# Patient Record
Sex: MALE | Race: BLACK OR AFRICAN AMERICAN | URBAN - METROPOLITAN AREA
[De-identification: names, ages, dates, MRNs, and addresses within clinical notes are randomized per-mention and may not be internally consistent; named-entity substitution may affect disease eponyms.]

---

## 2023-03-24 ENCOUNTER — OFFICE (OUTPATIENT)
Dept: URBAN - METROPOLITAN AREA CLINIC 63 | Facility: CLINIC | Age: 73
Setting detail: OPHTHALMOLOGY
End: 2023-03-24
Payer: MEDICARE

## 2023-03-24 DIAGNOSIS — H52.4: ICD-10-CM

## 2023-03-24 DIAGNOSIS — H40.052: ICD-10-CM

## 2023-03-24 DIAGNOSIS — H04.123: ICD-10-CM

## 2023-03-24 DIAGNOSIS — H40.89: ICD-10-CM

## 2023-03-24 DIAGNOSIS — H40.1113: ICD-10-CM

## 2023-03-24 PROCEDURE — 92015 DETERMINE REFRACTIVE STATE: CPT | Performed by: STUDENT IN AN ORGANIZED HEALTH CARE EDUCATION/TRAINING PROGRAM

## 2023-03-24 PROCEDURE — 92014 COMPRE OPH EXAM EST PT 1/>: CPT | Performed by: STUDENT IN AN ORGANIZED HEALTH CARE EDUCATION/TRAINING PROGRAM

## 2023-03-24 ASSESSMENT — CORNEAL EDEMA CLINICAL DESCRIPTION: OD_CORNEALEDEMA: 2+

## 2023-03-24 ASSESSMENT — SUPERFICIAL PUNCTATE KERATITIS (SPK)
OD_SPK: 1+ 2+
OS_SPK: 1+ 2+

## 2023-03-24 ASSESSMENT — CONFRONTATIONAL VISUAL FIELD TEST (CVF)
OD_FINDINGS: FULL
OS_FINDINGS: FULL

## 2023-03-24 ASSESSMENT — DECREASING TEAR LAKE - SEVERITY SCORE
OS_DEC_TEARLAKE: T
OD_DEC_TEARLAKE: T

## 2023-03-25 ASSESSMENT — REFRACTION_MANIFEST
OD_SPHERE: BALANCE
OD_CYLINDER: BALANCE
OD_AXIS: BALANCE
OS_AXIS: 080
OS_CYLINDER: -1.25
OS_ADD: +2.50
OS_SPHERE: +1.00
OS_VA1: 20/25

## 2023-03-25 ASSESSMENT — REFRACTION_AUTOREFRACTION
OD_SPHERE: -20.00
OS_CYLINDER: -1.25
OS_AXIS: 082
OD_CYLINDER: -0.75
OS_SPHERE: +1.00
OD_AXIS: 060

## 2023-03-25 ASSESSMENT — KERATOMETRY
OD_K2POWER_DIOPTERS: 43.00
METHOD_AUTO_MANUAL: MANUAL
OS_K1POWER_DIOPTERS: 42.75
OS_AXISANGLE_DEGREES: 003
OD_K1POWER_DIOPTERS: 37.25
OD_AXISANGLE_DEGREES: 119
OS_K2POWER_DIOPTERS: 43.75

## 2023-03-25 ASSESSMENT — AXIALLENGTH_DERIVED
OS_AL: 23.5378
OD_AL: 38.61
OS_AL: 23.5378

## 2023-03-25 ASSESSMENT — SPHEQUIV_DERIVED
OS_SPHEQUIV: 0.375
OS_SPHEQUIV: 0.375
OD_SPHEQUIV: -20.375

## 2023-03-25 ASSESSMENT — VISUAL ACUITY
OS_BCVA: LP
OD_BCVA: 20/60-1

## 2023-04-17 ENCOUNTER — OFFICE (OUTPATIENT)
Dept: URBAN - METROPOLITAN AREA CLINIC 63 | Facility: CLINIC | Age: 73
Setting detail: OPHTHALMOLOGY
End: 2023-04-17
Payer: MEDICARE

## 2023-04-17 DIAGNOSIS — H43.822: ICD-10-CM

## 2023-04-17 DIAGNOSIS — H35.033: ICD-10-CM

## 2023-04-17 DIAGNOSIS — H34.8310: ICD-10-CM

## 2023-04-17 DIAGNOSIS — H43.11: ICD-10-CM

## 2023-04-17 DIAGNOSIS — E11.3293: ICD-10-CM

## 2023-04-17 PROCEDURE — 92012 INTRM OPH EXAM EST PATIENT: CPT | Performed by: OPHTHALMOLOGY

## 2023-04-17 PROCEDURE — 92134 CPTRZ OPH DX IMG PST SGM RTA: CPT | Performed by: OPHTHALMOLOGY

## 2023-04-17 ASSESSMENT — CORNEAL EDEMA CLINICAL DESCRIPTION: OD_CORNEALEDEMA: 2+

## 2023-04-17 ASSESSMENT — REFRACTION_MANIFEST
OS_SPHERE: +1.00
OS_ADD: +2.50
OD_CYLINDER: BALANCE
OD_AXIS: BALANCE
OS_AXIS: 080
OS_CYLINDER: -1.25
OS_VA1: 20/25
OD_SPHERE: BALANCE

## 2023-04-17 ASSESSMENT — AXIALLENGTH_DERIVED
OS_AL: 23.5378
OS_AL: 23.5378
OD_AL: 38.61

## 2023-04-17 ASSESSMENT — CONFRONTATIONAL VISUAL FIELD TEST (CVF)
OS_FINDINGS: FULL
OD_FINDINGS: FULL

## 2023-04-17 ASSESSMENT — KERATOMETRY
OD_K1POWER_DIOPTERS: 37.25
METHOD_AUTO_MANUAL: MANUAL
OS_K1POWER_DIOPTERS: 42.75
OS_AXISANGLE_DEGREES: 003
OD_K2POWER_DIOPTERS: 43.00
OS_K2POWER_DIOPTERS: 43.75
OD_AXISANGLE_DEGREES: 119

## 2023-04-17 ASSESSMENT — SPHEQUIV_DERIVED
OD_SPHEQUIV: -20.375
OS_SPHEQUIV: 0.375
OS_SPHEQUIV: 0.375

## 2023-04-17 ASSESSMENT — SUPERFICIAL PUNCTATE KERATITIS (SPK)
OD_SPK: 1+ 2+
OS_SPK: 1+ 2+

## 2023-04-17 ASSESSMENT — VISUAL ACUITY
OD_BCVA: 20/50
OS_BCVA: LP

## 2023-04-17 ASSESSMENT — REFRACTION_AUTOREFRACTION
OD_SPHERE: -20.00
OD_CYLINDER: -0.75
OD_AXIS: 060
OS_AXIS: 082
OS_CYLINDER: -1.25
OS_SPHERE: +1.00

## 2023-04-17 ASSESSMENT — DECREASING TEAR LAKE - SEVERITY SCORE
OS_DEC_TEARLAKE: T
OD_DEC_TEARLAKE: T

## 2023-05-23 ENCOUNTER — RX ONLY (RX ONLY)
Age: 73
End: 2023-05-23

## 2023-05-23 ENCOUNTER — OFFICE (OUTPATIENT)
Dept: URBAN - METROPOLITAN AREA CLINIC 63 | Facility: CLINIC | Age: 73
Setting detail: OPHTHALMOLOGY
End: 2023-05-23
Payer: MEDICARE

## 2023-05-23 DIAGNOSIS — H40.1113: ICD-10-CM

## 2023-05-23 DIAGNOSIS — H40.89: ICD-10-CM

## 2023-05-23 PROCEDURE — 92250 FUNDUS PHOTOGRAPHY W/I&R: CPT | Performed by: OPHTHALMOLOGY

## 2023-05-23 PROCEDURE — 99213 OFFICE O/P EST LOW 20 MIN: CPT | Performed by: OPHTHALMOLOGY

## 2023-05-23 ASSESSMENT — SUPERFICIAL PUNCTATE KERATITIS (SPK)
OS_SPK: 1+ 2+
OD_SPK: 1+ 2+

## 2023-05-23 ASSESSMENT — KERATOMETRY
METHOD_AUTO_MANUAL: MANUAL
OS_K2POWER_DIOPTERS: 43.75
OD_AXISANGLE_DEGREES: UTP
OS_K1POWER_DIOPTERS: 43.00
OS_AXISANGLE_DEGREES: 168
OD_K2POWER_DIOPTERS: UTP
OD_K1POWER_DIOPTERS: UTP

## 2023-05-23 ASSESSMENT — REFRACTION_AUTOREFRACTION
OS_AXIS: 072
OD_AXIS: UTP
OD_SPHERE: UTP
OS_SPHERE: +1.00
OD_CYLINDER: UTP
OS_CYLINDER: -1.25

## 2023-05-23 ASSESSMENT — REFRACTION_MANIFEST
OS_SPHERE: +1.00
OS_VA1: 20/25
OS_CYLINDER: -1.25
OD_AXIS: BALANCE
OD_CYLINDER: BALANCE
OS_AXIS: 080
OS_ADD: +2.50
OD_SPHERE: BALANCE

## 2023-05-23 ASSESSMENT — CONFRONTATIONAL VISUAL FIELD TEST (CVF)
OS_FINDINGS: FULL
OD_FINDINGS: FULL

## 2023-05-23 ASSESSMENT — SPHEQUIV_DERIVED
OS_SPHEQUIV: 0.375
OS_SPHEQUIV: 0.375

## 2023-05-23 ASSESSMENT — VISUAL ACUITY
OD_BCVA: 20/50+1
OS_BCVA: LP

## 2023-05-23 ASSESSMENT — DECREASING TEAR LAKE - SEVERITY SCORE
OS_DEC_TEARLAKE: T
OD_DEC_TEARLAKE: T

## 2023-05-23 ASSESSMENT — AXIALLENGTH_DERIVED
OS_AL: 23.4921
OS_AL: 23.4921

## 2023-05-23 ASSESSMENT — CORNEAL EDEMA CLINICAL DESCRIPTION: OD_CORNEALEDEMA: 2+

## 2023-06-07 ENCOUNTER — OFFICE (OUTPATIENT)
Dept: URBAN - METROPOLITAN AREA CLINIC 63 | Facility: CLINIC | Age: 73
Setting detail: OPHTHALMOLOGY
End: 2023-06-07
Payer: MEDICARE

## 2023-06-07 DIAGNOSIS — H40.1113: ICD-10-CM

## 2023-06-07 DIAGNOSIS — H04.123: ICD-10-CM

## 2023-06-07 PROCEDURE — 92012 INTRM OPH EXAM EST PATIENT: CPT | Performed by: STUDENT IN AN ORGANIZED HEALTH CARE EDUCATION/TRAINING PROGRAM

## 2023-06-07 ASSESSMENT — SUPERFICIAL PUNCTATE KERATITIS (SPK)
OD_SPK: 1+ 2+
OS_SPK: 1+ 2+

## 2023-06-07 ASSESSMENT — KERATOMETRY
OS_K2POWER_DIOPTERS: 43.75
OD_AXISANGLE_DEGREES: 128
OS_K1POWER_DIOPTERS: 42.75
OS_AXISANGLE_DEGREES: 163
OD_K2POWER_DIOPTERS: 48.00
METHOD_AUTO_MANUAL: MANUAL
OD_K1POWER_DIOPTERS: 35.00

## 2023-06-07 ASSESSMENT — DECREASING TEAR LAKE - SEVERITY SCORE
OD_DEC_TEARLAKE: T
OS_DEC_TEARLAKE: T

## 2023-06-07 ASSESSMENT — REFRACTION_MANIFEST
OD_SPHERE: BALANCE
OS_CYLINDER: -1.25
OS_SPHERE: +1.00
OD_CYLINDER: BALANCE
OS_VA1: 20/25
OS_AXIS: 080
OS_ADD: +2.50
OD_AXIS: BALANCE

## 2023-06-07 ASSESSMENT — CONFRONTATIONAL VISUAL FIELD TEST (CVF)
OS_FINDINGS: FULL
OD_FINDINGS: FULL

## 2023-06-07 ASSESSMENT — VISUAL ACUITY
OS_BCVA: LP
OD_BCVA: 20/50

## 2023-06-07 ASSESSMENT — REFRACTION_AUTOREFRACTION
OD_AXIS: 059
OS_CYLINDER: -1.75
OS_SPHERE: +1.25
OD_SPHERE: -20.00
OD_CYLINDER: -0.75
OS_AXIS: 067

## 2023-06-07 ASSESSMENT — AXIALLENGTH_DERIVED
OD_AL: 37.31
OS_AL: 23.5378
OS_AL: 23.5378

## 2023-06-07 ASSESSMENT — SPHEQUIV_DERIVED
OD_SPHEQUIV: -20.375
OS_SPHEQUIV: 0.375
OS_SPHEQUIV: 0.375

## 2023-06-07 ASSESSMENT — CORNEAL EDEMA CLINICAL DESCRIPTION: OD_CORNEALEDEMA: 2+

## 2023-06-07 ASSESSMENT — TONOMETRY: OD_IOP_MMHG: 10

## 2023-06-22 ENCOUNTER — OFFICE (OUTPATIENT)
Dept: URBAN - METROPOLITAN AREA CLINIC 63 | Facility: CLINIC | Age: 73
Setting detail: OPHTHALMOLOGY
End: 2023-06-22
Payer: MEDICARE

## 2023-06-22 DIAGNOSIS — H34.8310: ICD-10-CM

## 2023-06-22 DIAGNOSIS — H35.033: ICD-10-CM

## 2023-06-22 DIAGNOSIS — E11.3293: ICD-10-CM

## 2023-06-22 DIAGNOSIS — H43.11: ICD-10-CM

## 2023-06-22 DIAGNOSIS — H43.822: ICD-10-CM

## 2023-06-22 PROCEDURE — 92134 CPTRZ OPH DX IMG PST SGM RTA: CPT | Performed by: OPHTHALMOLOGY

## 2023-06-22 PROCEDURE — 92012 INTRM OPH EXAM EST PATIENT: CPT | Performed by: OPHTHALMOLOGY

## 2023-06-22 ASSESSMENT — SUPERFICIAL PUNCTATE KERATITIS (SPK)
OS_SPK: 1+ 2+
OD_SPK: 1+ 2+

## 2023-06-22 ASSESSMENT — DECREASING TEAR LAKE - SEVERITY SCORE
OS_DEC_TEARLAKE: T
OD_DEC_TEARLAKE: T

## 2023-06-22 ASSESSMENT — CORNEAL EDEMA CLINICAL DESCRIPTION: OD_CORNEALEDEMA: 2+

## 2023-06-22 ASSESSMENT — CONFRONTATIONAL VISUAL FIELD TEST (CVF)
OD_FINDINGS: FULL
OS_FINDINGS: FULL

## 2023-06-23 ASSESSMENT — KERATOMETRY
OD_K1POWER_DIOPTERS: 35.00
OS_K2POWER_DIOPTERS: 43.75
OD_K2POWER_DIOPTERS: 48.00
METHOD_AUTO_MANUAL: MANUAL
OD_AXISANGLE_DEGREES: 128
OS_AXISANGLE_DEGREES: 163
OS_K1POWER_DIOPTERS: 42.75

## 2023-06-23 ASSESSMENT — AXIALLENGTH_DERIVED
OS_AL: 23.5378
OD_AL: 37.31

## 2023-06-23 ASSESSMENT — REFRACTION_AUTOREFRACTION
OD_CYLINDER: -0.75
OS_SPHERE: +1.25
OS_CYLINDER: -1.75
OD_AXIS: 059
OD_SPHERE: -20.00
OS_AXIS: 067

## 2023-06-23 ASSESSMENT — SPHEQUIV_DERIVED
OS_SPHEQUIV: 0.375
OD_SPHEQUIV: -20.375

## 2023-06-23 ASSESSMENT — VISUAL ACUITY
OS_BCVA: LP
OD_BCVA: 20/50

## 2023-09-19 ENCOUNTER — OFFICE (OUTPATIENT)
Dept: URBAN - METROPOLITAN AREA CLINIC 63 | Facility: CLINIC | Age: 73
Setting detail: OPHTHALMOLOGY
End: 2023-09-19
Payer: MEDICARE

## 2023-09-19 DIAGNOSIS — H40.89: ICD-10-CM

## 2023-09-19 PROCEDURE — 92012 INTRM OPH EXAM EST PATIENT: CPT | Performed by: OPHTHALMOLOGY

## 2023-09-19 PROCEDURE — 92133 CPTRZD OPH DX IMG PST SGM ON: CPT | Performed by: OPHTHALMOLOGY

## 2023-09-19 ASSESSMENT — KERATOMETRY
OS_AXISANGLE_DEGREES: 163
OD_AXISANGLE_DEGREES: 128
OD_K2POWER_DIOPTERS: 48.00
METHOD_AUTO_MANUAL: MANUAL
OS_K1POWER_DIOPTERS: 42.75
OD_K1POWER_DIOPTERS: 35.00
OS_K2POWER_DIOPTERS: 43.75

## 2023-09-19 ASSESSMENT — REFRACTION_AUTOREFRACTION
OD_AXIS: 059
OD_CYLINDER: -0.75
OD_SPHERE: -20.00
OS_CYLINDER: -1.75
OS_SPHERE: +1.25
OS_AXIS: 067

## 2023-09-19 ASSESSMENT — VISUAL ACUITY
OS_BCVA: LP
OD_BCVA: 20/50

## 2023-09-19 ASSESSMENT — CORNEAL EDEMA CLINICAL DESCRIPTION: OD_CORNEALEDEMA: 2+

## 2023-09-19 ASSESSMENT — DECREASING TEAR LAKE - SEVERITY SCORE
OD_DEC_TEARLAKE: T
OS_DEC_TEARLAKE: T

## 2023-09-19 ASSESSMENT — SPHEQUIV_DERIVED
OD_SPHEQUIV: -20.375
OS_SPHEQUIV: 0.375

## 2023-09-19 ASSESSMENT — AXIALLENGTH_DERIVED
OD_AL: 37.31
OS_AL: 23.5378

## 2023-09-19 ASSESSMENT — TONOMETRY
OD_IOP_MMHG: 10
OS_IOP_MMHG: 10

## 2023-09-19 ASSESSMENT — SUPERFICIAL PUNCTATE KERATITIS (SPK)
OD_SPK: 1+ 2+
OS_SPK: 1+ 2+

## 2023-09-19 ASSESSMENT — CONFRONTATIONAL VISUAL FIELD TEST (CVF)
OS_FINDINGS: FULL
OD_FINDINGS: FULL

## 2024-03-18 ENCOUNTER — OFFICE (OUTPATIENT)
Dept: URBAN - METROPOLITAN AREA CLINIC 63 | Facility: CLINIC | Age: 74
Setting detail: OPHTHALMOLOGY
End: 2024-03-18
Payer: MEDICARE

## 2024-03-18 DIAGNOSIS — E11.3293: ICD-10-CM

## 2024-03-18 DIAGNOSIS — H35.033: ICD-10-CM

## 2024-03-18 DIAGNOSIS — H34.8310: ICD-10-CM

## 2024-03-18 DIAGNOSIS — H43.11: ICD-10-CM

## 2024-03-18 PROCEDURE — 92014 COMPRE OPH EXAM EST PT 1/>: CPT | Performed by: OPHTHALMOLOGY

## 2024-03-18 PROCEDURE — 92134 CPTRZ OPH DX IMG PST SGM RTA: CPT | Performed by: OPHTHALMOLOGY

## 2024-08-27 ENCOUNTER — OFFICE (OUTPATIENT)
Dept: URBAN - METROPOLITAN AREA CLINIC 63 | Facility: CLINIC | Age: 74
Setting detail: OPHTHALMOLOGY
End: 2024-08-27
Payer: MEDICARE

## 2024-08-27 DIAGNOSIS — H35.033: ICD-10-CM

## 2024-08-27 DIAGNOSIS — H40.89: ICD-10-CM

## 2024-08-27 PROCEDURE — 92012 INTRM OPH EXAM EST PATIENT: CPT | Performed by: OPHTHALMOLOGY

## 2024-08-27 PROCEDURE — 92133 CPTRZD OPH DX IMG PST SGM ON: CPT | Performed by: OPHTHALMOLOGY

## 2024-08-27 PROCEDURE — 92020 GONIOSCOPY: CPT | Performed by: OPHTHALMOLOGY

## 2024-08-27 ASSESSMENT — CONFRONTATIONAL VISUAL FIELD TEST (CVF)
OD_FINDINGS: FULL
OS_FINDINGS: FULL

## 2025-02-11 ENCOUNTER — OFFICE (OUTPATIENT)
Dept: URBAN - METROPOLITAN AREA CLINIC 63 | Facility: CLINIC | Age: 75
Setting detail: OPHTHALMOLOGY
End: 2025-02-11
Payer: COMMERCIAL

## 2025-02-11 DIAGNOSIS — H40.89: ICD-10-CM

## 2025-02-11 DIAGNOSIS — H35.033: ICD-10-CM

## 2025-02-11 PROCEDURE — 92012 INTRM OPH EXAM EST PATIENT: CPT | Performed by: OPHTHALMOLOGY

## 2025-02-11 PROCEDURE — 92250 FUNDUS PHOTOGRAPHY W/I&R: CPT | Performed by: OPHTHALMOLOGY

## 2025-02-11 ASSESSMENT — CONFRONTATIONAL VISUAL FIELD TEST (CVF)
OD_FINDINGS: FULL
OS_FINDINGS: FULL

## 2025-02-11 ASSESSMENT — KERATOMETRY
OS_K1POWER_DIOPTERS: 42.75
OS_K2POWER_DIOPTERS: 43.75
OD_K2POWER_DIOPTERS: 48.00
METHOD_AUTO_MANUAL: MANUAL
OD_K1POWER_DIOPTERS: 35.00
OD_AXISANGLE_DEGREES: 128
OS_AXISANGLE_DEGREES: 163

## 2025-02-11 ASSESSMENT — REFRACTION_AUTOREFRACTION
OS_AXIS: 067
OD_AXIS: 059
OS_SPHERE: +1.25
OS_CYLINDER: -1.75
OD_SPHERE: -20.00
OD_CYLINDER: -0.75

## 2025-02-11 ASSESSMENT — SUPERFICIAL PUNCTATE KERATITIS (SPK)
OD_SPK: 1+ 2+
OS_SPK: 1+ 2+

## 2025-02-11 ASSESSMENT — VISUAL ACUITY
OD_BCVA: 20/30-2
OS_BCVA: LP

## 2025-02-11 ASSESSMENT — DECREASING TEAR LAKE - SEVERITY SCORE
OS_DEC_TEARLAKE: T
OD_DEC_TEARLAKE: T

## 2025-02-11 ASSESSMENT — CORNEAL EDEMA CLINICAL DESCRIPTION: OD_CORNEALEDEMA: 2+

## 2025-06-24 ENCOUNTER — OFFICE (OUTPATIENT)
Dept: URBAN - METROPOLITAN AREA CLINIC 63 | Facility: CLINIC | Age: 75
Setting detail: OPHTHALMOLOGY
End: 2025-06-24
Payer: COMMERCIAL

## 2025-06-24 DIAGNOSIS — H04.123: ICD-10-CM

## 2025-06-24 DIAGNOSIS — H40.1113: ICD-10-CM

## 2025-06-24 DIAGNOSIS — H35.033: ICD-10-CM

## 2025-06-24 PROCEDURE — 92012 INTRM OPH EXAM EST PATIENT: CPT | Performed by: OPHTHALMOLOGY

## 2025-06-24 ASSESSMENT — REFRACTION_AUTOREFRACTION
OS_CYLINDER: -1.00
OS_SPHERE: +0.25
OD_SPHERE: -20.00
OD_CYLINDER: -0.75
OD_AXIS: 059
OS_AXIS: 071

## 2025-06-24 ASSESSMENT — SUPERFICIAL PUNCTATE KERATITIS (SPK)
OS_SPK: 1+ 2+
OD_SPK: 1+ 2+

## 2025-06-24 ASSESSMENT — KERATOMETRY
OS_AXISANGLE_DEGREES: 033
OS_K1POWER_DIOPTERS: 43.25
OS_K2POWER_DIOPTERS: 43.75
METHOD_AUTO_MANUAL: MANUAL

## 2025-06-24 ASSESSMENT — DECREASING TEAR LAKE - SEVERITY SCORE
OD_DEC_TEARLAKE: T
OS_DEC_TEARLAKE: T

## 2025-06-24 ASSESSMENT — CONFRONTATIONAL VISUAL FIELD TEST (CVF)
OS_FINDINGS: FULL
OD_FINDINGS: FULL

## 2025-06-24 ASSESSMENT — CORNEAL EDEMA CLINICAL DESCRIPTION: OD_CORNEALEDEMA: 2+

## 2025-06-24 ASSESSMENT — VISUAL ACUITY
OD_BCVA: 20/30-2
OS_BCVA: LP

## 2025-07-08 ENCOUNTER — INPATIENT (INPATIENT)
Facility: HOSPITAL | Age: 75
LOS: 3 days | Discharge: HOME | DRG: 948 | End: 2025-07-12
Attending: INTERNAL MEDICINE | Admitting: INTERNAL MEDICINE
Payer: COMMERCIAL

## 2025-07-08 VITALS
TEMPERATURE: 98 F | DIASTOLIC BLOOD PRESSURE: 107 MMHG | SYSTOLIC BLOOD PRESSURE: 154 MMHG | HEART RATE: 93 BPM | WEIGHT: 117.51 LBS | HEIGHT: 67 IN | RESPIRATION RATE: 19 BRPM | OXYGEN SATURATION: 96 %

## 2025-07-08 DIAGNOSIS — R29.90 UNSPECIFIED SYMPTOMS AND SIGNS INVOLVING THE NERVOUS SYSTEM: ICD-10-CM

## 2025-07-08 DIAGNOSIS — E11.9 TYPE 2 DIABETES MELLITUS WITHOUT COMPLICATIONS: ICD-10-CM

## 2025-07-08 DIAGNOSIS — S06.5XAA TRAUMATIC SUBDURAL HEMORRHAGE WITH LOSS OF CONSCIOUSNESS STATUS UNKNOWN, INITIAL ENCOUNTER: ICD-10-CM

## 2025-07-08 DIAGNOSIS — I10 ESSENTIAL (PRIMARY) HYPERTENSION: ICD-10-CM

## 2025-07-08 DIAGNOSIS — N40.0 BENIGN PROSTATIC HYPERPLASIA WITHOUT LOWER URINARY TRACT SYMPTOMS: ICD-10-CM

## 2025-07-08 DIAGNOSIS — G93.41 METABOLIC ENCEPHALOPATHY: ICD-10-CM

## 2025-07-08 DIAGNOSIS — R41.82 ALTERED MENTAL STATUS, UNSPECIFIED: ICD-10-CM

## 2025-07-08 DIAGNOSIS — E16.2 HYPOGLYCEMIA, UNSPECIFIED: ICD-10-CM

## 2025-07-08 DIAGNOSIS — K21.9 GASTRO-ESOPHAGEAL REFLUX DISEASE WITHOUT ESOPHAGITIS: ICD-10-CM

## 2025-07-08 DIAGNOSIS — Z29.9 ENCOUNTER FOR PROPHYLACTIC MEASURES, UNSPECIFIED: ICD-10-CM

## 2025-07-08 DIAGNOSIS — E78.5 HYPERLIPIDEMIA, UNSPECIFIED: ICD-10-CM

## 2025-07-08 LAB
ALBUMIN SERPL ELPH-MCNC: 3.3 G/DL — SIGNIFICANT CHANGE UP (ref 3.3–5)
ALP SERPL-CCNC: 69 U/L — SIGNIFICANT CHANGE UP (ref 30–120)
ALT FLD-CCNC: 17 U/L — SIGNIFICANT CHANGE UP (ref 10–60)
ANION GAP SERPL CALC-SCNC: 8 MMOL/L — SIGNIFICANT CHANGE UP (ref 5–17)
APTT BLD: 30 SEC — SIGNIFICANT CHANGE UP (ref 26.1–36.8)
AST SERPL-CCNC: 39 U/L — SIGNIFICANT CHANGE UP (ref 10–40)
BASOPHILS # BLD AUTO: 0.02 K/UL — SIGNIFICANT CHANGE UP (ref 0–0.2)
BASOPHILS # BLD MANUAL: 0 K/UL — SIGNIFICANT CHANGE UP (ref 0–0.2)
BASOPHILS NFR BLD AUTO: 0.6 % — SIGNIFICANT CHANGE UP (ref 0–2)
BASOPHILS NFR BLD MANUAL: 0 % — SIGNIFICANT CHANGE UP (ref 0–2)
BILIRUB SERPL-MCNC: 0.2 MG/DL — SIGNIFICANT CHANGE UP (ref 0.2–1.2)
BUN SERPL-MCNC: 14 MG/DL — SIGNIFICANT CHANGE UP (ref 7–23)
CALCIUM SERPL-MCNC: 8.6 MG/DL — SIGNIFICANT CHANGE UP (ref 8.4–10.5)
CHLORIDE SERPL-SCNC: 105 MMOL/L — SIGNIFICANT CHANGE UP (ref 96–108)
CO2 SERPL-SCNC: 28 MMOL/L — SIGNIFICANT CHANGE UP (ref 22–31)
CREAT SERPL-MCNC: 1.12 MG/DL — SIGNIFICANT CHANGE UP (ref 0.5–1.3)
EGFR: 68 ML/MIN/1.73M2 — SIGNIFICANT CHANGE UP
EGFR: 68 ML/MIN/1.73M2 — SIGNIFICANT CHANGE UP
EOSINOPHIL # BLD AUTO: 0.1 K/UL — SIGNIFICANT CHANGE UP (ref 0–0.5)
EOSINOPHIL # BLD MANUAL: 0.18 K/UL — SIGNIFICANT CHANGE UP (ref 0–0.5)
EOSINOPHIL NFR BLD AUTO: 2.9 % — SIGNIFICANT CHANGE UP (ref 0–6)
EOSINOPHIL NFR BLD MANUAL: 5.2 % — SIGNIFICANT CHANGE UP (ref 0–6)
GLUCOSE BLDC GLUCOMTR-MCNC: 137 MG/DL — HIGH (ref 70–99)
GLUCOSE BLDC GLUCOMTR-MCNC: 91 MG/DL — SIGNIFICANT CHANGE UP (ref 70–99)
GLUCOSE SERPL-MCNC: 76 MG/DL — SIGNIFICANT CHANGE UP (ref 70–99)
HCT VFR BLD CALC: 38.4 % — LOW (ref 39–50)
HGB BLD-MCNC: 12.3 G/DL — LOW (ref 13–17)
IMM GRANULOCYTES # BLD AUTO: 0.01 K/UL — SIGNIFICANT CHANGE UP (ref 0–0.07)
IMM GRANULOCYTES NFR BLD AUTO: 0.3 % — SIGNIFICANT CHANGE UP (ref 0–0.9)
INR BLD: 1.02 RATIO — SIGNIFICANT CHANGE UP (ref 0.85–1.16)
LYMPHOCYTES # BLD AUTO: 1.29 K/UL — SIGNIFICANT CHANGE UP (ref 1–3.3)
LYMPHOCYTES # BLD MANUAL: 1.24 K/UL — SIGNIFICANT CHANGE UP (ref 1–3.3)
LYMPHOCYTES NFR BLD AUTO: 37.9 % — SIGNIFICANT CHANGE UP (ref 13–44)
LYMPHOCYTES NFR BLD MANUAL: 36.5 % — SIGNIFICANT CHANGE UP (ref 13–44)
MCHC RBC-ENTMCNC: 30.4 PG — SIGNIFICANT CHANGE UP (ref 27–34)
MCHC RBC-ENTMCNC: 32 G/DL — SIGNIFICANT CHANGE UP (ref 32–36)
MCV RBC AUTO: 95 FL — SIGNIFICANT CHANGE UP (ref 80–100)
MONOCYTES # BLD AUTO: 0.33 K/UL — SIGNIFICANT CHANGE UP (ref 0–0.9)
MONOCYTES # BLD MANUAL: 0.21 K/UL — SIGNIFICANT CHANGE UP (ref 0–0.9)
MONOCYTES NFR BLD AUTO: 9.7 % — SIGNIFICANT CHANGE UP (ref 2–14)
MONOCYTES NFR BLD MANUAL: 6.1 % — SIGNIFICANT CHANGE UP (ref 2–14)
NEUTROPHILS # BLD AUTO: 1.65 K/UL — LOW (ref 1.8–7.4)
NEUTROPHILS # BLD MANUAL: 1.77 K/UL — LOW (ref 1.8–7.4)
NEUTROPHILS NFR BLD AUTO: 48.6 % — SIGNIFICANT CHANGE UP (ref 43–77)
NEUTROPHILS NFR BLD MANUAL: 52.2 % — SIGNIFICANT CHANGE UP (ref 43–77)
NRBC # BLD AUTO: 0 K/UL — SIGNIFICANT CHANGE UP (ref 0–0)
NRBC # FLD: 0 K/UL — SIGNIFICANT CHANGE UP (ref 0–0)
NRBC BLD AUTO-RTO: 0 /100 WBCS — SIGNIFICANT CHANGE UP (ref 0–0)
PLAT MORPH BLD: NORMAL — SIGNIFICANT CHANGE UP
PLATELET # BLD AUTO: 238 K/UL — SIGNIFICANT CHANGE UP (ref 150–400)
PLATELET COUNT - ESTIMATE: NORMAL — SIGNIFICANT CHANGE UP
PMV BLD: 8.5 FL — SIGNIFICANT CHANGE UP (ref 7–13)
POTASSIUM SERPL-MCNC: 3.6 MMOL/L — SIGNIFICANT CHANGE UP (ref 3.5–5.3)
POTASSIUM SERPL-SCNC: 3.6 MMOL/L — SIGNIFICANT CHANGE UP (ref 3.5–5.3)
PROT SERPL-MCNC: 6.5 G/DL — SIGNIFICANT CHANGE UP (ref 6–8.3)
PROTHROM AB SERPL-ACNC: 11.8 SEC — SIGNIFICANT CHANGE UP (ref 9.9–13.4)
RBC # BLD: 4.04 M/UL — LOW (ref 4.2–5.8)
RBC # FLD: 13.1 % — SIGNIFICANT CHANGE UP (ref 10.3–14.5)
RBC BLD AUTO: NORMAL — SIGNIFICANT CHANGE UP
SODIUM SERPL-SCNC: 141 MMOL/L — SIGNIFICANT CHANGE UP (ref 135–145)
TROPONIN I, HIGH SENSITIVITY RESULT: 25.8 NG/L — SIGNIFICANT CHANGE UP
WBC # BLD: 3.4 K/UL — LOW (ref 3.8–10.5)
WBC # FLD AUTO: 3.4 K/UL — LOW (ref 3.8–10.5)

## 2025-07-08 PROCEDURE — 93010 ELECTROCARDIOGRAM REPORT: CPT

## 2025-07-08 PROCEDURE — 71045 X-RAY EXAM CHEST 1 VIEW: CPT | Mod: 26

## 2025-07-08 PROCEDURE — 82962 GLUCOSE BLOOD TEST: CPT

## 2025-07-08 PROCEDURE — 70498 CT ANGIOGRAPHY NECK: CPT | Mod: 26

## 2025-07-08 PROCEDURE — 36415 COLL VENOUS BLD VENIPUNCTURE: CPT

## 2025-07-08 PROCEDURE — 70450 CT HEAD/BRAIN W/O DYE: CPT | Mod: 26,XU

## 2025-07-08 PROCEDURE — 85025 COMPLETE CBC W/AUTO DIFF WBC: CPT

## 2025-07-08 PROCEDURE — 85730 THROMBOPLASTIN TIME PARTIAL: CPT

## 2025-07-08 PROCEDURE — 70496 CT ANGIOGRAPHY HEAD: CPT | Mod: 26

## 2025-07-08 PROCEDURE — 71045 X-RAY EXAM CHEST 1 VIEW: CPT

## 2025-07-08 PROCEDURE — 70496 CT ANGIOGRAPHY HEAD: CPT

## 2025-07-08 PROCEDURE — 80053 COMPREHEN METABOLIC PANEL: CPT

## 2025-07-08 PROCEDURE — 70498 CT ANGIOGRAPHY NECK: CPT

## 2025-07-08 PROCEDURE — 70450 CT HEAD/BRAIN W/O DYE: CPT

## 2025-07-08 PROCEDURE — 84484 ASSAY OF TROPONIN QUANT: CPT

## 2025-07-08 PROCEDURE — 99285 EMERGENCY DEPT VISIT HI MDM: CPT

## 2025-07-08 PROCEDURE — 85610 PROTHROMBIN TIME: CPT

## 2025-07-08 RX ORDER — GLUCAGON 3 MG/1
1 POWDER NASAL ONCE
Refills: 0 | Status: DISCONTINUED | OUTPATIENT
Start: 2025-07-08 | End: 2025-07-12

## 2025-07-08 RX ORDER — DICLOFENAC SODIUM 10 MG/G
2.25 GEL TOPICAL
Refills: 0 | DISCHARGE

## 2025-07-08 RX ORDER — ONDANSETRON HCL/PF 4 MG/2 ML
4 VIAL (ML) INJECTION EVERY 8 HOURS
Refills: 0 | Status: DISCONTINUED | OUTPATIENT
Start: 2025-07-08 | End: 2025-07-12

## 2025-07-08 RX ORDER — RISPERIDONE 4 MG
0.5 TABLET ORAL DAILY
Refills: 0 | Status: DISCONTINUED | OUTPATIENT
Start: 2025-07-08 | End: 2025-07-12

## 2025-07-08 RX ORDER — MELATONIN 5 MG
3 TABLET ORAL AT BEDTIME
Refills: 0 | Status: DISCONTINUED | OUTPATIENT
Start: 2025-07-08 | End: 2025-07-12

## 2025-07-08 RX ORDER — FINASTERIDE 1 MG/1
5 TABLET, FILM COATED ORAL DAILY
Refills: 0 | Status: DISCONTINUED | OUTPATIENT
Start: 2025-07-08 | End: 2025-07-12

## 2025-07-08 RX ORDER — SODIUM CHLORIDE 9 G/1000ML
1000 INJECTION, SOLUTION INTRAVENOUS
Refills: 0 | Status: DISCONTINUED | OUTPATIENT
Start: 2025-07-08 | End: 2025-07-12

## 2025-07-08 RX ORDER — ACETAMINOPHEN 500 MG/5ML
650 LIQUID (ML) ORAL EVERY 6 HOURS
Refills: 0 | Status: DISCONTINUED | OUTPATIENT
Start: 2025-07-08 | End: 2025-07-12

## 2025-07-08 RX ORDER — ACETAMINOPHEN 500 MG/5ML
1000 LIQUID (ML) ORAL ONCE
Refills: 0 | Status: DISCONTINUED | OUTPATIENT
Start: 2025-07-08 | End: 2025-07-12

## 2025-07-08 RX ORDER — DEXTROSE 50 % IN WATER 50 %
15 SYRINGE (ML) INTRAVENOUS ONCE
Refills: 0 | Status: DISCONTINUED | OUTPATIENT
Start: 2025-07-08 | End: 2025-07-12

## 2025-07-08 RX ORDER — POLYETHYLENE GLYCOL 3350 17 G/17G
17 POWDER, FOR SOLUTION ORAL DAILY
Refills: 0 | Status: DISCONTINUED | OUTPATIENT
Start: 2025-07-08 | End: 2025-07-12

## 2025-07-08 RX ORDER — BRIMONIDINE TARTRATE, TIMOLOL MALEATE 2; 5 MG/ML; MG/ML
1 SOLUTION/ DROPS TOPICAL
Refills: 0 | DISCHARGE

## 2025-07-08 RX ORDER — ALFUZOSIN HYDROCHLORIDE 10 MG/1
1 TABLET, EXTENDED RELEASE ORAL
Refills: 0 | DISCHARGE

## 2025-07-08 RX ORDER — HALOPERIDOL 10 MG/1
0.5 TABLET ORAL EVERY 6 HOURS
Refills: 0 | Status: DISCONTINUED | OUTPATIENT
Start: 2025-07-08 | End: 2025-07-12

## 2025-07-08 RX ORDER — DEXTROSE 50 % IN WATER 50 %
25 SYRINGE (ML) INTRAVENOUS ONCE
Refills: 0 | Status: DISCONTINUED | OUTPATIENT
Start: 2025-07-08 | End: 2025-07-12

## 2025-07-08 RX ORDER — DEXTROSE 50 % IN WATER 50 %
12.5 SYRINGE (ML) INTRAVENOUS ONCE
Refills: 0 | Status: DISCONTINUED | OUTPATIENT
Start: 2025-07-08 | End: 2025-07-12

## 2025-07-08 RX ORDER — BIMATOPROST 0.3 MG/ML
1 SOLUTION/ DROPS OPHTHALMIC
Refills: 0 | DISCHARGE

## 2025-07-08 RX ORDER — ATORVASTATIN CALCIUM 80 MG/1
80 TABLET, FILM COATED ORAL AT BEDTIME
Refills: 0 | Status: DISCONTINUED | OUTPATIENT
Start: 2025-07-08 | End: 2025-07-12

## 2025-07-08 RX ORDER — NETARSUDIL 0.2 MG/ML
1 SOLUTION/ DROPS OPHTHALMIC; TOPICAL
Refills: 0 | DISCHARGE

## 2025-07-08 RX ORDER — SODIUM CHLORIDE 9 G/1000ML
1000 INJECTION, SOLUTION INTRAVENOUS
Refills: 0 | Status: DISCONTINUED | OUTPATIENT
Start: 2025-07-08 | End: 2025-07-09

## 2025-07-08 RX ORDER — LATANOPROST PF 0.05 MG/ML
1 SOLUTION/ DROPS OPHTHALMIC AT BEDTIME
Refills: 0 | Status: DISCONTINUED | OUTPATIENT
Start: 2025-07-08 | End: 2025-07-12

## 2025-07-08 RX ORDER — LIDOCAINE HYDROCHLORIDE 20 MG/ML
1 JELLY TOPICAL DAILY
Refills: 0 | Status: DISCONTINUED | OUTPATIENT
Start: 2025-07-09 | End: 2025-07-12

## 2025-07-08 RX ORDER — MAGNESIUM, ALUMINUM HYDROXIDE 200-200 MG
30 TABLET,CHEWABLE ORAL EVERY 4 HOURS
Refills: 0 | Status: DISCONTINUED | OUTPATIENT
Start: 2025-07-08 | End: 2025-07-12

## 2025-07-08 RX ORDER — BISACODYL 5 MG
10 TABLET, DELAYED RELEASE (ENTERIC COATED) ORAL DAILY
Refills: 0 | Status: DISCONTINUED | OUTPATIENT
Start: 2025-07-08 | End: 2025-07-12

## 2025-07-08 RX ADMIN — LATANOPROST PF 1 DROP(S): 0.05 SOLUTION/ DROPS OPHTHALMIC at 22:10

## 2025-07-08 RX ADMIN — SODIUM CHLORIDE 50 MILLILITER(S): 9 INJECTION, SOLUTION INTRAVENOUS at 22:09

## 2025-07-08 NOTE — H&P ADULT - PROBLEM SELECTOR PLAN 3
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  - Not a candidate for mechanical thrombectomy w/ distal right vertebral artery occlusion- non amendable to thrombectomy.  - Follow up HgbA1c/ lipid profile - start HD atrovastatin daily   - Bedside dysphagia screen /PT / OT evaluation  - MRI brain w/o contrast

## 2025-07-08 NOTE — ED ADULT NURSE NOTE - CHIEF COMPLAINT QUOTE
PT BIB EMS from [facility] c/o AMS; pt found unresponsive at facility at 1700 with glucose of 49; pt got glucagon x2 from facility; hx dementia; pt responsive upon arrival to ED ; answers to name

## 2025-07-08 NOTE — CONSULT NOTE ADULT - SUBJECTIVE AND OBJECTIVE BOX
CHIEF COMPLAINT/ REASON FOR VISIT  .. Patient was seen to address the  issue listed under PROBLEM LIST which is located toward bottom of this note     [facility]    Allergies    Allergy Status Unknown    Intolerances        PAST MEDICAL & SURGICAL HISTORY:  DM (diabetes mellitus), type 2      HTN (hypertension)      HLD (hyperlipidemia)      Dementia      GERD (gastroesophageal reflux disease)      BPH (benign prostatic hyperplasia)      Anxiety      Glaucoma      DM (diabetes mellitus), type 2          FAMILY HISTORY:      Home Medications:  acetaminophen 500 mg oral tablet: 1 tab(s) orally once a day (08 Jul 2025 20:44)  alfuzosin 10 mg oral tablet, extended release: 1 tab(s) orally once a day (at bedtime) (08 Jul 2025 20:44)  Aspercreme Arthritis Pain 1% topical gel: Apply topically to affected area once a day apply to lower back (08 Jul 2025 20:44)  Basaglar KwikPen 100 units/mL subcutaneous solution: 12 unit(s) subcutaneous once a day (08 Jul 2025 20:44)  Combigan 0.2%-0.5% ophthalmic solution: 1 drop(s) in each affected eye 2 times a day each eye (08 Jul 2025 20:44)  finasteride 5 mg oral tablet: 1 tab(s) orally once a day (08 Jul 2025 20:44)  lisinopril 10 mg oral tablet: 1 tab(s) orally once a day (08 Jul 2025 20:44)  Lumigan 0.01% ophthalmic solution: 1 drop(s) in each affected eye once a day (at bedtime) each eye (08 Jul 2025 20:46)  metFORMIN 500 mg oral tablet: 1 tab(s) orally once a day (08 Jul 2025 20:44)  NovoLOG 100 units/mL subcutaneous solution: 4 unit(s) subcutaneous 3 times a day (with meals) (08 Jul 2025 20:44)  pantoprazole 40 mg oral delayed release tablet: 1 tab(s) orally once a day (in the morning) (08 Jul 2025 20:44)  Rhopressa 0.02% ophthalmic solution: 1 drop(s) in each eye 3 times a day each eye (08 Jul 2025 20:44)  risperiDONE 0.5 mg oral tablet: 1 tab(s) orally once a day (08 Jul 2025 20:44)      MEDICATIONS  (STANDING):  atorvastatin 80 milliGRAM(s) Oral at bedtime  dextrose 5% + sodium chloride 0.9%. 1000 milliLiter(s) (50 mL/Hr) IV Continuous <Continuous>  dextrose 5%. 1000 milliLiter(s) (100 mL/Hr) IV Continuous <Continuous>  dextrose 5%. 1000 milliLiter(s) (50 mL/Hr) IV Continuous <Continuous>  dextrose 50% Injectable 25 Gram(s) IV Push once  dextrose 50% Injectable 12.5 Gram(s) IV Push once  dextrose 50% Injectable 25 Gram(s) IV Push once  dextrose Oral Gel 15 Gram(s) Oral once  finasteride 5 milliGRAM(s) Oral daily  glucagon  Injectable 1 milliGRAM(s) IntraMuscular once  latanoprost 0.005% Ophthalmic Solution 1 Drop(s) Both EYES at bedtime  pantoprazole  Injectable 40 milliGRAM(s) IV Push daily  polyethylene glycol 3350 17 Gram(s) Oral daily  risperiDONE   Tablet 0.5 milliGRAM(s) Oral daily    MEDICATIONS  (PRN):  acetaminophen     Tablet .. 650 milliGRAM(s) Oral every 6 hours PRN Temp greater or equal to 38C (100.4F), Mild Pain (1 - 3)  acetaminophen   IVPB .. 1000 milliGRAM(s) IV Intermittent once PRN Temp greater or equal to 38.5C (101.3F), Moderate Pain (4 - 6)  aluminum hydroxide/magnesium hydroxide/simethicone Suspension 30 milliLiter(s) Oral every 4 hours PRN Dyspepsia  bisacodyl Suppository 10 milliGRAM(s) Rectal daily PRN Constipation  haloperidol    Injectable 0.5 milliGRAM(s) IntraMuscular every 6 hours PRN Agitation  melatonin 3 milliGRAM(s) Oral at bedtime PRN Insomnia  ondansetron Injectable 4 milliGRAM(s) IV Push every 8 hours PRN Nausea and/or Vomiting      Diet, NPO:   Except Medications (07-08-25 @ 20:41) [Active]          Vital Signs Last 24 Hrs  T(C): 36.4 (08 Jul 2025 21:00), Max: 36.6 (08 Jul 2025 18:17)  T(F): 97.5 (08 Jul 2025 21:00), Max: 97.9 (08 Jul 2025 18:17)  HR: 82 (08 Jul 2025 21:00) (73 - 94)  BP: 128/84 (08 Jul 2025 21:00) (128/84 - 215/93)  BP(mean): 112 (08 Jul 2025 20:30) (112 - 123)  RR: 14 (08 Jul 2025 21:00) (14 - 19)  SpO2: 100% (08 Jul 2025 21:00) (96% - 100%)    Parameters below as of 08 Jul 2025 21:00  Patient On (Oxygen Delivery Method): nasal cannula  O2 Flow (L/min): 2                LABS:                        12.3   3.40  )-----------( 238      ( 08 Jul 2025 18:16 )             38.4     07-08    141  |  105  |  14  ----------------------------<  76  3.6   |  28  |  1.12    Ca    8.6      08 Jul 2025 18:16    TPro  6.5  /  Alb  3.3  /  TBili  0.2  /  DBili  x   /  AST  39  /  ALT  17  /  AlkPhos  69  07-08    PT/INR - ( 08 Jul 2025 18:16 )   PT: 11.8 sec;   INR: 1.02 ratio         PTT - ( 08 Jul 2025 18:16 )  PTT:30.0 sec  Urinalysis Basic - ( 08 Jul 2025 18:16 )    Color: x / Appearance: x / SG: x / pH: x  Gluc: 76 mg/dL / Ketone: x  / Bili: x / Urobili: x   Blood: x / Protein: x / Nitrite: x   Leuk Esterase: x / RBC: x / WBC x   Sq Epi: x / Non Sq Epi: x / Bacteria: x            WBC:  WBC Count: 3.40 K/uL (07-08 @ 18:16)      MICROBIOLOGY:  RECENT CULTURES:              PT/INR - ( 08 Jul 2025 18:16 )   PT: 11.8 sec;   INR: 1.02 ratio         PTT - ( 08 Jul 2025 18:16 )  PTT:30.0 sec    Sodium:  Sodium: 141 mmol/L (07-08 @ 18:16)      1.12 mg/dL 07-08 @ 18:16      Hemoglobin:  Hemoglobin: 12.3 g/dL (07-08 @ 18:16)      Platelets: Platelet Count - Automated: 238 K/uL (07-08 @ 18:16)      LIVER FUNCTIONS - ( 08 Jul 2025 18:16 )  Alb: 3.3 g/dL / Pro: 6.5 g/dL / ALK PHOS: 69 U/L / ALT: 17 U/L / AST: 39 U/L / GGT: x             Urinalysis Basic - ( 08 Jul 2025 18:16 )    Color: x / Appearance: x / SG: x / pH: x  Gluc: 76 mg/dL / Ketone: x  / Bili: x / Urobili: x   Blood: x / Protein: x / Nitrite: x   Leuk Esterase: x / RBC: x / WBC x   Sq Epi: x / Non Sq Epi: x / Bacteria: x        RADIOLOGY & ADDITIONAL STUDIES:      MICROBIOLOGY:  RECENT CULTURES:

## 2025-07-08 NOTE — ED ADULT NURSE NOTE - NS ED NURSE RECORD ANOTHER HT AND WT
Detail Level: Simple Initiate Treatment: Triamcinolone acetonide 0.1% topical cream: Apply to affected areas of body twice daily x 1-2 weeks until clear. Plan: I advised patient to stop using [employer] lotion and to switch to something fragrance free, such as Cerave cream. Yes

## 2025-07-08 NOTE — ED PROVIDER NOTE - OBJECTIVE STATEMENT
Patient brought in by EMS from [facility] for altered mental status.  Per EMS report patient's last known well was just prior to 5 PM when patient was next seen he was unresponsive.  EMS relates staff at [facility] found the patient to be hypoglycemic with a blood sugar of 49 they gave 2 doses of glucagon and oral glucose paste and blood glucose improved to 71 upon EMS arrival.  EMS reports they were told patient has dementia his baseline is awake and confused which is how the patient presented with EMS.  Patient poor historian secondary to dementia unsure why in ER denies any complaints.  No further history available.  PMD Herminio Bird

## 2025-07-08 NOTE — ED PROVIDER NOTE - CLINICAL SUMMARY MEDICAL DECISION MAKING FREE TEXT BOX
Patient brought in by EMS from [facility] for altered mental status.  Per EMS report patient's last known well was just prior to 5 PM when patient was next seen he was unresponsive.  EMS relates staff at [facility] found the patient to be hypoglycemic with a blood sugar of 49 they gave 2 doses of glucagon and oral glucose paste and blood glucose improved to 71 upon EMS arrival.  EMS reports they were told patient has dementia his baseline is awake and confused which is how the patient presented with EMS.  Patient poor historian secondary to dementia unsure why in ER denies any complaints.  No further history available.  PMD Herminio Bird    Plan stroke workup including CT head CTA head and neck EKG labs hypoglycemia protocol    Differential including but not limited to ICH CVA TIA arrhythmia hypoglycemia other electrolyte abnormality

## 2025-07-08 NOTE — PATIENT PROFILE ADULT - FALL HARM RISK - HARM RISK INTERVENTIONS

## 2025-07-08 NOTE — ED ADULT NURSE NOTE - OBJECTIVE STATEMENT
75 yo male biba from [facility] nursing and rehab for AMS. Pt was being given a shower by staff, was placed back in bed, when CNA returned moments later they noted that the patient was not responsive, this was around 5:10PM. FS was checked, reading low. given x 2 of glucagon pta. Pt drowsy but arousable, answers to name. BP noted to be elevated upon arrival. Staff from NH stating pt received BP meds this AM. Pt denies any pain. On CM.

## 2025-07-08 NOTE — ED ADULT TRIAGE NOTE - STATUS:
Patient on xarelto, recently had a miscarriage and recent D and C 6 weeks. Last week had heavy vaginal bleeding again, treated and had a blood transfusion today, during which her BP and heart rate spiked and the transfusion was stopped. Vitals came down and patient was discharged. Patient was home for a few hours and both increased again. Applied

## 2025-07-08 NOTE — H&P ADULT - HISTORY OF PRESENT ILLNESS
Patient is 75 yo AAM who was brought in by EMS from [facility] for altered mental status.  Per EMS report patient's last known well was just prior to 5 PM when patient was next seen he was unresponsive.  EMS relates staff at [facility] found the patient to be hypoglycemic with a blood sugar of 49 they gave 2 doses of glucagon and oral glucose paste and blood glucose improved to 71 upon EMS arrival.  EMS reports they were told patient has dementia his baseline is awake and confused which is how the patient presented with EMS.  Patient poor historian secondary to dementia unsure why in ER denies any complaints.  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  - Not a candidate for mechanical thrombectomy w/ distal right vertebral artery occlusion- non amendable to thrombectomy.  - Follow up HgbA1c/ lipid profile - start HD atrovastatin daily   - Bedside dysphagia screen /PT / OT evaluation  - MRI brain w/o contrast  - TTE; r/o cardioembolic event /PFO  - Maintain permissive HTN for 24 hours

## 2025-07-08 NOTE — H&P ADULT - PROBLEM SELECTOR PLAN 5
Hypoglycemia protocol , FS q 4 hrs Accu-Cheks monitoring and insulin corrective regimen  sliding scale coverage with short acting insulin, add long-acting insulin as needed ,no concentrated sweets diet, serial labs ,HbA1C,education

## 2025-07-08 NOTE — ED PROVIDER NOTE - PROGRESS NOTE DETAILS
D/W Cristy (telestroke PA) agrees telenecteplace likely due to hypoglycemia, measurable deficits likely chronic called telestroke CTA results d/w telestroke PA, she relates doesn't think TNK indicated, but  she will d/w telestroke attending discussed case with Dr. Boyd will admit. Cristy from telestroke pa, recommend no TNK given patient hx of subdural, recommend admit for stroke eval and MRI

## 2025-07-08 NOTE — H&P ADULT - NSICDXPASTMEDICALHX_GEN_ALL_CORE_FT
PAST MEDICAL HISTORY:  Anxiety     BPH (benign prostatic hyperplasia)     Dementia     DM (diabetes mellitus), type 2     GERD (gastroesophageal reflux disease)     Glaucoma     HLD (hyperlipidemia)     HTN (hypertension)

## 2025-07-08 NOTE — ED ADULT NURSE REASSESSMENT NOTE - NS ED NURSE REASSESS COMMENT FT1
spoke with patients daughter Heaven Marr ([phone]). Pt remains resting stretcher, drowsy but arousable.  currently.

## 2025-07-08 NOTE — ED ADULT TRIAGE NOTE - CHIEF COMPLAINT QUOTE
PT BIB EMS from [facility] c/o AMS; pt found unresponsive at facility at 1700 with glucose of 49; pt got glucagon x2 from facility; hx dementia; pt responsive upon arrival to ED ; answers to name

## 2025-07-08 NOTE — H&P ADULT - NSHPLABSRESULTS_GEN_ALL_CORE
< from: Xray Chest 1 View- PORTABLE-Urgent (07.08.25 @ 18:48) >    Single frontal view of the chest demonstrates the lungs to be clear. The   cardiomediastinal silhouette is enlarged. No acute osseous abnormalities.      IMPRESSION: No acute cardiopulmonary disease process.    < end of copied text >    < from: CT Brain Stroke Protocol (07.08.25 @ 18:27) >    CT head:    No acute infarct or hemorrhage is present. No significant edema is   identified. Encephalomalacia in the left occipital lobe, suggestive of   chronic infarct.  Small right holohemispheric subdural fluid collection   is present measuring up to 0.9 cm in width, may reflect a hygroma versus   sequelae of old subdural hematoma. No mass effect on underlying   parenchyma.  The ventricles, sulci and basal cisterns appear unremarkable.    The paranasal sinuses and mastoid air cells are clear.    Glaucoma device of the right globe with decreased size of the right   globe. Status post bilateral lens replacements.    CTA head:    There is atherosclerotic irregularity of the intradural right vertebral   artery with occlusion of the distal most segment of the right intradural   vertebral artery. Left intradural vertebral artery and basilar artery   appear unremarkable. Bilateral posterior cerebral arteries and superior   cerebellar arteries are unremarkable.    The intracranial internal carotid arteries, middle cerebral arteries, and   anterior cerebral arteries are intact without hemodynamically significant   stenosis.  There is no evidence of aneurysm or vascular malformation.    Dural venous sinuses are patent.    CTA neck:    The carotid circulation is intact without hemodynamically significant   stenosis. There are calcifications of bilateral carotid bulbs.  The   vertebral arteries are patent.      IMPRESSION:      1.   Brain: No acute infarct or hemorrhage.  Encephalomalacia in the   left occipital lobe, suggestive of chronic infarct.  Small right   holohemispheric subdural fluid collection is present measuring up to 0.9   cm in width, may reflect a hygroma versus sequelae of old subdural   hematoma.  2.   Intracranial circulation:  There is atherosclerotic irregularity of   the intradural right vertebral artery with occlusion of the distal most   segment of the right intradural vertebral artery. Left intradural   vertebral artery and basilar artery appear unremarkable. No   hemodynamically significant stenosis of the anterior circulation.      3.    Right carotid/vertebral artery system:  No hemodynamically   significant stenosis.      4.   Left carotid/vertebral artery system:  No hemodynamically   significant stenosis.  < end of copied text >

## 2025-07-08 NOTE — ED ADULT NURSE NOTE - NSFALLRISKINTERV_ED_ALL_ED
Assistance OOB with selected safe patient handling equipment if applicable/Assistance with ambulation/Communicate fall risk and risk factors to all staff, patient, and family/Monitor gait and stability/Monitor for mental status changes and reorient to person, place, and time, as needed/Move patient closer to nursing station/within visual sight of ED staff/Provide visual cue: yellow wristband, yellow gown, etc/Reinforce activity limits and safety measures with patient and family/Toileting schedule using arm’s reach rule for commode and bathroom/Use of alarms - bed, stretcher, chair and/or video monitoring/Call bell, personal items and telephone in reach/Instruct patient to call for assistance before getting out of bed/chair/stretcher/Non-slip footwear applied when patient is off stretcher/[place] to call system/Physically safe environment - no spills, clutter or unnecessary equipment/Purposeful Proactive Rounding/Room/bathroom lighting operational, light cord in reach Assistance OOB with selected safe patient handling equipment if applicable/Assistance with ambulation/Communicate fall risk and risk factors to all staff, patient, and family/Monitor gait and stability/Monitor for mental status changes and reorient to person, place, and time, as needed/Move patient closer to nursing station/within visual sight of ED staff/Provide patient with walking aids/Provide visual cue: yellow wristband, yellow gown, etc/Reinforce activity limits and safety measures with patient and family/Toileting schedule using arm’s reach rule for commode and bathroom/Use of alarms - bed, stretcher, chair and/or video monitoring/Call bell, personal items and telephone in reach/Instruct patient to call for assistance before getting out of bed/chair/stretcher/Non-slip footwear applied when patient is off stretcher/[place] to call system/Physically safe environment - no spills, clutter or unnecessary equipment/Purposeful Proactive Rounding/Room/bathroom lighting operational, light cord in reach

## 2025-07-08 NOTE — ED ADULT NURSE NOTE - NSSEPSISSUSPECTED_ED_A_ED
Ventricular Rate : 75  Atrial Rate : 75  P-R Interval : 128  QRS Duration : 88  Q-T Interval : 418  QTC Calculation(Bezet) : 466  P Axis : 76  R Axis : 2  T Axis : 34  Diagnosis : Normal sinus rhythm  Normal ECG  No previous ECGs available  Confirmed by MAE GROVES AMIT ([id number]) on 4/11/2019 11:29:03 AM   No

## 2025-07-08 NOTE — ED PROVIDER NOTE - DIFFERENTIAL DIAGNOSIS
Differential Diagnosis Differential including but not limited to ICH CVA TIA arrhythmia hypoglycemia other electrolyte abnormality

## 2025-07-08 NOTE — H&P ADULT - ASSESSMENT
Patient is 73 yo AAM who was brought in by EMS from [facility] for altered mental status.  Per EMS report patient's last known well was just prior to 5 PM when patient was next seen he was unresponsive.  EMS relates staff at [facility] found the patient to be hypoglycemic with a blood sugar of 49 they gave 2 doses of glucagon and oral glucose paste and blood glucose improved to 71 upon EMS arrival.  EMS reports they were told patient has dementia his baseline is awake and confused which is how the patient presented with EMS.  Patient poor historian secondary to dementia unsure why in ER denies any complaints.  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  - Not a candidate for mechanical thrombectomy w/ distal right vertebral artery occlusion- non amendable to thrombectomy.  - Follow up HgbA1c/ lipid profile - start HD atrovastatin daily   - Bedside dysphagia screen /PT / OT evaluation  - MRI brain w/o contrast

## 2025-07-08 NOTE — CHART NOTE - NSCHARTNOTEFT_GEN_A_CORE
Audio Telestroke Consult Note    FREDY HOUSTON ,MRN-[id number] , presented to [facility], w/ last known normal time 5pm    HPI:    75 y/o Male hx dementia ( at baseline awake and confused) who was  BIBEMS from [facility] for unresponsiveness,  Per EMS report patient's last known well was just prior to 5 PM when patient was next seen he was unresponsive.  EMS relates staff at [facility] found the patient to be hypoglycemic with a blood sugar of 49 s/p 2 doses of glucagon and oral glucose paste and glucose improved to 71 upon arrival to ED.  EMS reports they were told patient has dementia his baseline is awake and confused which is how the patient presented with EMS.  Patient poor historian secondary to dementia unsure why in ER denies any complaints.     Per ED attending: NIHSS 5 for RLE drift, unable to answer LOC questions (hx dementia), dysarthric (adentitious)       NEUROLOGIC EXAMINATION deferred – interprofessional audio discussion only       HOME MEDICATIONS:  Home Medications:         VITALS/DATA/ORDERS: [x] Reviewed     IMAGING:  < from: CT Brain Stroke Protocol (07.08.25 @ 18:27) >          1.   Brain: No acute infarct or hemorrhage.  Encephalomalacia in the   left occipital lobe, suggestive of chronic infarct.  Small right   holohemispheric subdural fluid collection is present measuring up to 0.9   cm in width, may reflect a hygroma versus sequelae of old subdural   hematoma.      Critical value:  I discussed the finding of this report with Dr. Erwin at 6:35 PM on 7/8/2025.  Critical value policy of the hospital   was followed.  Read back and confirmation of receipt of this   communication was performed.  This verbal communication supplements the   text report of this document.    2.   Intracranial circulation:  There is atherosclerotic irregularity of   the intradural right vertebral artery with occlusion of the distal most   segment of the right intradural vertebral artery. Left intradural   vertebral artery and basilar artery appear unremarkable. No   hemodynamically significant stenosis of the anterior circulation.          3.    Right carotid/vertebral artery system:  No hemodynamically   significant stenosis.        4.   Left carotid/vertebral artery system:  No hemodynamically   significant stenosis.    < end of copied text >      Labs:                                             12.3                  Neurophils% (auto):   48.6   (07-08 @ 18:16):    3.40 )-----------(238          Lymphocytes% (auto):  37.9                                          38.4                   Eosinphils% (auto):   2.9      Manual%: Neutrophils x    ; Lymphocytes x    ; Eosinophils x    ; Bands%: x    ; Blasts x                                    141    |  105    |  14                  Calcium: 8.6   / iCa: x      (07-08 @ 18:16)    ----------------------------<  76        Magnesium: x                                3.6     |  28     |  1.12             Phosphorous: x        TPro  6.5    /  Alb  3.3    /  TBili  0.2    /  DBili  x      /  AST  39     /  ALT  17     /  AlkPhos  69     08 Jul 2025 18:16    ( 07-08 @ 18:16 )   PT: 11.8 sec;   INR: 1.02 ratio  aPTT: 30.0 sec    CAPILLARY BLOOD GLUCOSE      POCT Blood Glucose.: 137 mg/dL (08 Jul 2025 19:48)    Platelet Count - Automated: 238 K/uL (07-08-25 @ 18:16)  Platelet Count - Estimate: Normal (07-08-25 @ 18:16)        Inclusion Criteria:  Clearly defined time onset within 4.5 hours of treatment YES [X]    Ischemic stroke with a measurable deficit on NIHSS or a non-measurable deficit that is deemed disabling?  NO [X]    Review thrombolytic CONTRAINDICATIONS  RELATIVE EXCLUSION CRITERIA   [X] stroke severity too mild (non-disabling)    RISKS/BENEFITS DISCUSSION done by ED provider.    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  - Not a candidate for mechanical thrombectomy w/ distal right vertebral artery occlusion- non amendable to thrombectomy.  - Follow up HgbA1c/ lipid profile - start HD atrovastatin daily   - Bedside dysphagia screen /PT / OT evaluation  - MRI brain w/o contrast  - TTE; r/o cardioembolic event /PFO  - Maintain permissive HTN for 24 hours   - Further management and stroke workup by inhouse Neurology team  - Plan discussed with Dr. Key, Dr. Yohannes Dupont    Consultation provided via live, two-way audio  stream.     Patient Location:  [facility] Attestation of Treatment:  The management and treatment decisions which include intravenous thrombolysis and mechanical thrombectomy were discussed with: Neurology Attending.     Neurology Attending: Dr. Kika Sierra Audio Telestroke Consult Note    FREDY HOUSTON ,[id number] , presented to [facility], w/ last known normal time 5pm    HPI:    75 y/o Male hx dementia ( at baseline awake and confused) who was  BIBEMS from [facility] for unresponsiveness,  Per EMS report patient's last known well was just prior to 5 PM when patient was next seen he was unresponsive.  EMS relates staff at [facility] found the patient to be hypoglycemic with a blood sugar of 49 s/p 2 doses of glucagon and oral glucose paste and glucose improved to 71 upon arrival to ED.  EMS reports they were told patient has dementia his baseline is awake and confused which is how the patient presented with EMS.  Patient poor historian secondary to dementia unsure why in ER denies any complaints.     Per ED attending: NIHSS 5 for RLE drift, unable to answer LOC questions (hx dementia), dysarthric (adentitious)       NEUROLOGIC EXAMINATION deferred – interprofessional audio discussion only       HOME MEDICATIONS:  Home Medications:         VITALS/DATA/ORDERS: [x] Reviewed     IMAGING:  < from: CT Brain Stroke Protocol (07.08.25 @ 18:27) >          1.   Brain: No acute infarct or hemorrhage.  Encephalomalacia in the   left occipital lobe, suggestive of chronic infarct.  Small right   holohemispheric subdural fluid collection is present measuring up to 0.9   cm in width, may reflect a hygroma versus sequelae of old subdural   hematoma.      Critical value:  I discussed the finding of this report with Dr. Erwin at 6:35 PM on 7/8/2025.  Critical value policy of the hospital   was followed.  Read back and confirmation of receipt of this   communication was performed.  This verbal communication supplements the   text report of this document.    2.   Intracranial circulation:  There is atherosclerotic irregularity of   the intradural right vertebral artery with occlusion of the distal most   segment of the right intradural vertebral artery. Left intradural   vertebral artery and basilar artery appear unremarkable. No   hemodynamically significant stenosis of the anterior circulation.          3.    Right carotid/vertebral artery system:  No hemodynamically   significant stenosis.        4.   Left carotid/vertebral artery system:  No hemodynamically   significant stenosis.    < end of copied text >      Labs:                                             12.3                  Neurophils% (auto):   48.6   (07-08 @ 18:16):    3.40 )-----------(238          Lymphocytes% (auto):  37.9                                          38.4                   Eosinphils% (auto):   2.9      Manual%: Neutrophils x    ; Lymphocytes x    ; Eosinophils x    ; Bands%: x    ; Blasts x                                    141    |  105    |  14                  Calcium: 8.6   / iCa: x      (07-08 @ 18:16)    ----------------------------<  76        Magnesium: x                                3.6     |  28     |  1.12             Phosphorous: x        TPro  6.5    /  Alb  3.3    /  TBili  0.2    /  DBili  x      /  AST  39     /  ALT  17     /  AlkPhos  69     08 Jul 2025 18:16    ( 07-08 @ 18:16 )   PT: 11.8 sec;   INR: 1.02 ratio  aPTT: 30.0 sec    CAPILLARY BLOOD GLUCOSE      POCT Blood Glucose.: 137 mg/dL (08 Jul 2025 19:48)    Platelet Count - Automated: 238 K/uL (07-08-25 @ 18:16)  Platelet Count - Estimate: Normal (07-08-25 @ 18:16)        Inclusion Criteria:  Clearly defined time onset within 4.5 hours of treatment YES [X]    Ischemic stroke with a measurable deficit on NIHSS or a non-measurable deficit that is deemed disabling?  NO [X]    Review thrombolytic CONTRAINDICATIONS  RELATIVE EXCLUSION CRITERIA   [X] stroke severity too mild (non-disabling)    RISKS/BENEFITS DISCUSSION done by ED provider.    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  - Not a candidate for mechanical thrombectomy w/ distal right vertebral artery occlusion- non amendable to thrombectomy.  - Follow up HgbA1c/ lipid profile - start HD atrovastatin daily   - Bedside dysphagia screen /PT / OT evaluation  - MRI brain w/o contrast  - TTE; r/o cardioembolic event /PFO  - Maintain permissive HTN for 24 hours   - Further management and stroke workup by inhouse Neurology team  - Plan discussed with Dr. Key, Dr. Yohannes Dupont and Dr. Brown-Lizzy    Consultation provided via live, two-way audio  stream.     Patient Location:  [facility] Attestation of Treatment:  The management and treatment decisions which include intravenous thrombolysis and mechanical thrombectomy were discussed with: Neurology Attending.     Neurology Attending: Dr. Kika Sierra

## 2025-07-09 DIAGNOSIS — Z51.5 ENCOUNTER FOR PALLIATIVE CARE: ICD-10-CM

## 2025-07-09 DIAGNOSIS — Z71.89 OTHER SPECIFIED COUNSELING: ICD-10-CM

## 2025-07-09 LAB
A1C WITH ESTIMATED AVERAGE GLUCOSE RESULT: 7.2 % — HIGH (ref 4–5.6)
ALBUMIN SERPL ELPH-MCNC: 3.1 G/DL — LOW (ref 3.3–5)
ALP SERPL-CCNC: 73 U/L — SIGNIFICANT CHANGE UP (ref 30–120)
ALT FLD-CCNC: 15 U/L — SIGNIFICANT CHANGE UP (ref 10–60)
ANION GAP SERPL CALC-SCNC: 9 MMOL/L — SIGNIFICANT CHANGE UP (ref 5–17)
AST SERPL-CCNC: 36 U/L — SIGNIFICANT CHANGE UP (ref 10–40)
BASOPHILS # BLD AUTO: 0.02 K/UL — SIGNIFICANT CHANGE UP (ref 0–0.2)
BASOPHILS NFR BLD AUTO: 0.3 % — SIGNIFICANT CHANGE UP (ref 0–2)
BILIRUB SERPL-MCNC: 0.3 MG/DL — SIGNIFICANT CHANGE UP (ref 0.2–1.2)
BUN SERPL-MCNC: 14 MG/DL — SIGNIFICANT CHANGE UP (ref 7–23)
CALCIUM SERPL-MCNC: 8.6 MG/DL — SIGNIFICANT CHANGE UP (ref 8.4–10.5)
CHLORIDE SERPL-SCNC: 106 MMOL/L — SIGNIFICANT CHANGE UP (ref 96–108)
CHOLEST SERPL-MCNC: 155 MG/DL — SIGNIFICANT CHANGE UP
CO2 SERPL-SCNC: 25 MMOL/L — SIGNIFICANT CHANGE UP (ref 22–31)
CREAT SERPL-MCNC: 0.95 MG/DL — SIGNIFICANT CHANGE UP (ref 0.5–1.3)
EGFR: 83 ML/MIN/1.73M2 — SIGNIFICANT CHANGE UP
EGFR: 83 ML/MIN/1.73M2 — SIGNIFICANT CHANGE UP
EOSINOPHIL # BLD AUTO: 0.05 K/UL — SIGNIFICANT CHANGE UP (ref 0–0.5)
EOSINOPHIL NFR BLD AUTO: 0.7 % — SIGNIFICANT CHANGE UP (ref 0–6)
ESTIMATED AVERAGE GLUCOSE: 160 MG/DL — HIGH (ref 68–114)
FOLATE SERPL-MCNC: 17.7 NG/ML — SIGNIFICANT CHANGE UP
GLUCOSE BLDC GLUCOMTR-MCNC: 105 MG/DL — HIGH (ref 70–99)
GLUCOSE BLDC GLUCOMTR-MCNC: 162 MG/DL — HIGH (ref 70–99)
GLUCOSE BLDC GLUCOMTR-MCNC: 201 MG/DL — HIGH (ref 70–99)
GLUCOSE BLDC GLUCOMTR-MCNC: 248 MG/DL — HIGH (ref 70–99)
GLUCOSE SERPL-MCNC: 102 MG/DL — HIGH (ref 70–99)
HCT VFR BLD CALC: 40.7 % — SIGNIFICANT CHANGE UP (ref 39–50)
HDLC SERPL-MCNC: 66 MG/DL — SIGNIFICANT CHANGE UP
HGB BLD-MCNC: 12.8 G/DL — LOW (ref 13–17)
IMM GRANULOCYTES # BLD AUTO: 0.01 K/UL — SIGNIFICANT CHANGE UP (ref 0–0.07)
IMM GRANULOCYTES NFR BLD AUTO: 0.1 % — SIGNIFICANT CHANGE UP (ref 0–0.9)
INR BLD: 0.98 RATIO — SIGNIFICANT CHANGE UP (ref 0.85–1.16)
LDLC SERPL-MCNC: 79 MG/DL — SIGNIFICANT CHANGE UP
LIPID PNL WITH DIRECT LDL SERPL: 79 MG/DL — SIGNIFICANT CHANGE UP
LYMPHOCYTES # BLD AUTO: 0.87 K/UL — LOW (ref 1–3.3)
LYMPHOCYTES NFR BLD AUTO: 12.4 % — LOW (ref 13–44)
MCHC RBC-ENTMCNC: 30.8 PG — SIGNIFICANT CHANGE UP (ref 27–34)
MCHC RBC-ENTMCNC: 31.4 G/DL — LOW (ref 32–36)
MCV RBC AUTO: 97.8 FL — SIGNIFICANT CHANGE UP (ref 80–100)
MONOCYTES # BLD AUTO: 0.54 K/UL — SIGNIFICANT CHANGE UP (ref 0–0.9)
MONOCYTES NFR BLD AUTO: 7.7 % — SIGNIFICANT CHANGE UP (ref 2–14)
NEUTROPHILS # BLD AUTO: 5.51 K/UL — SIGNIFICANT CHANGE UP (ref 1.8–7.4)
NEUTROPHILS NFR BLD AUTO: 78.8 % — HIGH (ref 43–77)
NONHDLC SERPL-MCNC: 89 MG/DL — SIGNIFICANT CHANGE UP
NRBC # BLD AUTO: 0 K/UL — SIGNIFICANT CHANGE UP (ref 0–0)
NRBC # FLD: 0 K/UL — SIGNIFICANT CHANGE UP (ref 0–0)
NRBC BLD AUTO-RTO: 0 /100 WBCS — SIGNIFICANT CHANGE UP (ref 0–0)
PLATELET # BLD AUTO: 178 K/UL — SIGNIFICANT CHANGE UP (ref 150–400)
PMV BLD: 9 FL — SIGNIFICANT CHANGE UP (ref 7–13)
POTASSIUM SERPL-MCNC: 4.2 MMOL/L — SIGNIFICANT CHANGE UP (ref 3.5–5.3)
POTASSIUM SERPL-SCNC: 4.2 MMOL/L — SIGNIFICANT CHANGE UP (ref 3.5–5.3)
PROT SERPL-MCNC: 6.3 G/DL — SIGNIFICANT CHANGE UP (ref 6–8.3)
PROTHROM AB SERPL-ACNC: 11.4 SEC — SIGNIFICANT CHANGE UP (ref 9.9–13.4)
RBC # BLD: 4.16 M/UL — LOW (ref 4.2–5.8)
RBC # FLD: 13.1 % — SIGNIFICANT CHANGE UP (ref 10.3–14.5)
SODIUM SERPL-SCNC: 140 MMOL/L — SIGNIFICANT CHANGE UP (ref 135–145)
TRIGL SERPL-MCNC: 46 MG/DL — SIGNIFICANT CHANGE UP
TSH SERPL-MCNC: 1.14 UIU/ML — SIGNIFICANT CHANGE UP (ref 0.27–4.2)
VIT B12 SERPL-MCNC: 421 PG/ML — SIGNIFICANT CHANGE UP (ref 232–1245)
WBC # BLD: 7 K/UL — SIGNIFICANT CHANGE UP (ref 3.8–10.5)
WBC # FLD AUTO: 7 K/UL — SIGNIFICANT CHANGE UP (ref 3.8–10.5)

## 2025-07-09 PROCEDURE — 82746 ASSAY OF FOLIC ACID SERUM: CPT

## 2025-07-09 PROCEDURE — 85730 THROMBOPLASTIN TIME PARTIAL: CPT

## 2025-07-09 PROCEDURE — 99223 1ST HOSP IP/OBS HIGH 75: CPT

## 2025-07-09 PROCEDURE — 82607 VITAMIN B-12: CPT

## 2025-07-09 PROCEDURE — 70450 CT HEAD/BRAIN W/O DYE: CPT

## 2025-07-09 PROCEDURE — 85610 PROTHROMBIN TIME: CPT

## 2025-07-09 PROCEDURE — 92610 EVALUATE SWALLOWING FUNCTION: CPT

## 2025-07-09 PROCEDURE — 36415 COLL VENOUS BLD VENIPUNCTURE: CPT

## 2025-07-09 PROCEDURE — 71045 X-RAY EXAM CHEST 1 VIEW: CPT

## 2025-07-09 PROCEDURE — 80061 LIPID PANEL: CPT

## 2025-07-09 PROCEDURE — 97161 PT EVAL LOW COMPLEX 20 MIN: CPT

## 2025-07-09 PROCEDURE — 84484 ASSAY OF TROPONIN QUANT: CPT

## 2025-07-09 PROCEDURE — 70498 CT ANGIOGRAPHY NECK: CPT

## 2025-07-09 PROCEDURE — 93306 TTE W/DOPPLER COMPLETE: CPT | Mod: 26

## 2025-07-09 PROCEDURE — 80053 COMPREHEN METABOLIC PANEL: CPT

## 2025-07-09 PROCEDURE — 93356 MYOCRD STRAIN IMG SPCKL TRCK: CPT

## 2025-07-09 PROCEDURE — 82248 BILIRUBIN DIRECT: CPT

## 2025-07-09 PROCEDURE — 70496 CT ANGIOGRAPHY HEAD: CPT

## 2025-07-09 PROCEDURE — 83036 HEMOGLOBIN GLYCOSYLATED A1C: CPT

## 2025-07-09 PROCEDURE — 93005 ELECTROCARDIOGRAM TRACING: CPT

## 2025-07-09 PROCEDURE — 84443 ASSAY THYROID STIM HORMONE: CPT

## 2025-07-09 PROCEDURE — 85025 COMPLETE CBC W/AUTO DIFF WBC: CPT

## 2025-07-09 PROCEDURE — 82962 GLUCOSE BLOOD TEST: CPT

## 2025-07-09 RX ORDER — B1/B2/B3/B5/B6/B12/VIT C/FOLIC 500-0.5 MG
1 TABLET ORAL DAILY
Refills: 0 | Status: DISCONTINUED | OUTPATIENT
Start: 2025-07-09 | End: 2025-07-12

## 2025-07-09 RX ORDER — LISINOPRIL 5 MG/1
10 TABLET ORAL DAILY
Refills: 0 | Status: DISCONTINUED | OUTPATIENT
Start: 2025-07-09 | End: 2025-07-10

## 2025-07-09 RX ADMIN — Medication 40 MILLIGRAM(S): at 12:19

## 2025-07-09 RX ADMIN — LATANOPROST PF 1 DROP(S): 0.05 SOLUTION/ DROPS OPHTHALMIC at 21:23

## 2025-07-09 RX ADMIN — LIDOCAINE HYDROCHLORIDE 1 PATCH: 20 JELLY TOPICAL at 19:30

## 2025-07-09 RX ADMIN — LIDOCAINE HYDROCHLORIDE 1 PATCH: 20 JELLY TOPICAL at 10:12

## 2025-07-09 RX ADMIN — Medication 100 MILLIGRAM(S): at 13:41

## 2025-07-09 RX ADMIN — FINASTERIDE 5 MILLIGRAM(S): 1 TABLET, FILM COATED ORAL at 12:29

## 2025-07-09 RX ADMIN — ATORVASTATIN CALCIUM 80 MILLIGRAM(S): 80 TABLET, FILM COATED ORAL at 21:23

## 2025-07-09 RX ADMIN — LISINOPRIL 10 MILLIGRAM(S): 5 TABLET ORAL at 10:12

## 2025-07-09 RX ADMIN — Medication 1 TABLET(S): at 13:41

## 2025-07-09 RX ADMIN — LIDOCAINE HYDROCHLORIDE 1 PATCH: 20 JELLY TOPICAL at 22:30

## 2025-07-09 RX ADMIN — Medication 0.5 MILLIGRAM(S): at 12:29

## 2025-07-09 RX ADMIN — POLYETHYLENE GLYCOL 3350 17 GRAM(S): 17 POWDER, FOR SOLUTION ORAL at 12:19

## 2025-07-09 NOTE — DIETITIAN INITIAL EVALUATION ADULT - PERTINENT MEDS FT
MEDICATIONS  (STANDING):  atorvastatin 80 milliGRAM(s) Oral at bedtime  dextrose 5%. 1000 milliLiter(s) (100 mL/Hr) IV Continuous <Continuous>  dextrose 5%. 1000 milliLiter(s) (50 mL/Hr) IV Continuous <Continuous>  dextrose 50% Injectable 25 Gram(s) IV Push once  dextrose 50% Injectable 12.5 Gram(s) IV Push once  dextrose 50% Injectable 25 Gram(s) IV Push once  dextrose Oral Gel 15 Gram(s) Oral once  finasteride 5 milliGRAM(s) Oral daily  glucagon  Injectable 1 milliGRAM(s) IntraMuscular once  latanoprost 0.005% Ophthalmic Solution 1 Drop(s) Both EYES at bedtime  lidocaine   4% Patch 1 Patch Transdermal daily  lisinopril 10 milliGRAM(s) Oral daily  pantoprazole  Injectable 40 milliGRAM(s) IV Push daily  polyethylene glycol 3350 17 Gram(s) Oral daily  risperiDONE   Tablet 0.5 milliGRAM(s) Oral daily    MEDICATIONS  (PRN):  acetaminophen     Tablet .. 650 milliGRAM(s) Oral every 6 hours PRN Temp greater or equal to 38C (100.4F), Mild Pain (1 - 3)  acetaminophen   IVPB .. 1000 milliGRAM(s) IV Intermittent once PRN Temp greater or equal to 38.5C (101.3F), Moderate Pain (4 - 6)  aluminum hydroxide/magnesium hydroxide/simethicone Suspension 30 milliLiter(s) Oral every 4 hours PRN Dyspepsia  bisacodyl Suppository 10 milliGRAM(s) Rectal daily PRN Constipation  haloperidol    Injectable 0.5 milliGRAM(s) IntraMuscular every 6 hours PRN Agitation  hydrALAZINE 10 milliGRAM(s) Oral every 8 hours PRN Systolic blood pressure >170  melatonin 3 milliGRAM(s) Oral at bedtime PRN Insomnia  ondansetron Injectable 4 milliGRAM(s) IV Push every 8 hours PRN Nausea and/or Vomiting

## 2025-07-09 NOTE — SWALLOW BEDSIDE ASSESSMENT ADULT - ASR SWALLOW RECOMMEND DIAG
Defer at this time given no overt s/s of aspiration or penetration noted during today's bedside swallow assessment and chest imaging results stating "IMPRESSION: No acute cardiopulmonary disease process."

## 2025-07-09 NOTE — DIETITIAN INITIAL EVALUATION ADULT - PERTINENT LABORATORY DATA
07-09    140  |  106  |  14  ----------------------------<  102[H]  4.2   |  25  |  0.95    Ca    8.6      09 Jul 2025 05:30    TPro  6.3  /  Alb  3.1[L]  /  TBili  0.3  /  DBili  0.1  /  AST  36  /  ALT  15  /  AlkPhos  73  07-09  POCT Blood Glucose.: 105 mg/dL (07-09-25 @ 05:20)

## 2025-07-09 NOTE — DISCHARGE NOTE PROVIDER - CARE PROVIDERS DIRECT ADDRESSES
[email or web address],DirectAddress_Unknown

## 2025-07-09 NOTE — DIETITIAN INITIAL EVALUATION ADULT - OTHER INFO
Visited patient in room, pt unable to answer questions at this time, confused/disoriented and discussed at team rounds and performed extensive chart review. presents with good appetite/po intake of pureed/CCHO meals, consumed >50% of breakfast. Passed dysphagia screening and received first meal this AM. Pending SLP eval and DM evals.  NO GI events reported, LBM 7/8 noted w fecal incontinence . No reported difficulty chewing or swallowing. NKFA. Current adm weight 53.3kg, weight appears to be stable, will continue to monitor weight trends as able.    Pertinent labs/meds reviewed: receiving miralax; FS  x 24hr    Education not appropriate at this time due to AMS. Recommend CCHO diet with evening snack with possibility of liberalizing to regular if pt continues with hypoglycemic episodes. Defer textural modification to SLP. Recommend provide thiamine supplementation and MVI.  RD to remain available per protocol.

## 2025-07-09 NOTE — CONSULT NOTE ADULT - SUBJECTIVE AND OBJECTIVE BOX
Patient is a 74y old  Male who presents with a chief complaint of Altered mental status    Per HPI: Patient is 75 yo AAM who was brought in by EMS from [facility] for altered mental status.  Per EMS report patient's last known well was just prior to 5 PM when patient was next seen he was unresponsive.  EMS relates staff at [facility] found the patient to be hypoglycemic with a blood sugar of 49 they gave 2 doses of glucagon and oral glucose paste and blood glucose improved to 71 upon EMS arrival.  EMS reports they were told patient has dementia his baseline is awake and confused which is how the patient presented with EMS.  Patient poor historian secondary to dementia unsure why in ER denies any complaints.  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  - Not a candidate for mechanical thrombectomy w/ distal right vertebral artery occlusion- non amendable to thrombectomy.  - Follow up HgbA1c/ lipid profile - start HD atrovastatin daily   - Bedside dysphagia screen /PT / OT evaluation  - MRI brain w/o contrast  - TTE; r/o cardioembolic event /PFO  - Maintain permissive HTN for 24 hours  (08 Jul 2025 20:54) (09 Jul 2025 10:01)      Reason For Consult:     HPI:  Patient is 75 yo AAM who was brought in by EMS from [facility] for altered mental status.  Per EMS report patient's last known well was just prior to 5 PM when patient was next seen he was unresponsive.  EMS relates staff at [facility] found the patient to be hypoglycemic with a blood sugar of 49 they gave 2 doses of glucagon and oral glucose paste and blood glucose improved to 71 upon EMS arrival.  EMS reports they were told patient has dementia his baseline is awake and confused which is how the patient presented with EMS.  Patient poor historian secondary to dementia unsure why in ER denies any complaints.  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  - Not a candidate for mechanical thrombectomy w/ distal right vertebral artery occlusion- non amendable to thrombectomy.  - Follow up HgbA1c/ lipid profile - start HD atrovastatin daily   - Bedside dysphagia screen /PT / OT evaluation  - MRI brain w/o contrast  - TTE; r/o cardioembolic event /PFO  - Maintain permissive HTN for 24 hours  (08 Jul 2025 20:54)      PAST MEDICAL & SURGICAL HISTORY:  DM (diabetes mellitus), type 2      HTN (hypertension)      HLD (hyperlipidemia)      Dementia      GERD (gastroesophageal reflux disease)      BPH (benign prostatic hyperplasia)      Anxiety      Glaucoma      DM (diabetes mellitus), type 2          FAMILY HISTORY:        Social History:    MEDICATIONS  (STANDING):  atorvastatin 80 milliGRAM(s) Oral at bedtime  dextrose 5%. 1000 milliLiter(s) (100 mL/Hr) IV Continuous <Continuous>  dextrose 5%. 1000 milliLiter(s) (50 mL/Hr) IV Continuous <Continuous>  dextrose 50% Injectable 25 Gram(s) IV Push once  dextrose 50% Injectable 12.5 Gram(s) IV Push once  dextrose 50% Injectable 25 Gram(s) IV Push once  dextrose Oral Gel 15 Gram(s) Oral once  finasteride 5 milliGRAM(s) Oral daily  glucagon  Injectable 1 milliGRAM(s) IntraMuscular once  latanoprost 0.005% Ophthalmic Solution 1 Drop(s) Both EYES at bedtime  lidocaine   4% Patch 1 Patch Transdermal daily  lisinopril 10 milliGRAM(s) Oral daily  pantoprazole  Injectable 40 milliGRAM(s) IV Push daily  polyethylene glycol 3350 17 Gram(s) Oral daily  risperiDONE   Tablet 0.5 milliGRAM(s) Oral daily    MEDICATIONS  (PRN):  acetaminophen     Tablet .. 650 milliGRAM(s) Oral every 6 hours PRN Temp greater or equal to 38C (100.4F), Mild Pain (1 - 3)  acetaminophen   IVPB .. 1000 milliGRAM(s) IV Intermittent once PRN Temp greater or equal to 38.5C (101.3F), Moderate Pain (4 - 6)  aluminum hydroxide/magnesium hydroxide/simethicone Suspension 30 milliLiter(s) Oral every 4 hours PRN Dyspepsia  bisacodyl Suppository 10 milliGRAM(s) Rectal daily PRN Constipation  haloperidol    Injectable 0.5 milliGRAM(s) IntraMuscular every 6 hours PRN Agitation  hydrALAZINE 10 milliGRAM(s) Oral every 8 hours PRN Systolic blood pressure >170  melatonin 3 milliGRAM(s) Oral at bedtime PRN Insomnia  ondansetron Injectable 4 milliGRAM(s) IV Push every 8 hours PRN Nausea and/or Vomiting        T(C): 36.9 (07-09-25 @ 08:32), Max: 37.1 (07-08-25 @ 21:36)  HR: 87 (07-09-25 @ 12:24) (68 - 94)  BP: 164/91 (07-09-25 @ 12:24) (128/84 - 215/93)  RR: 16 (07-09-25 @ 12:24) (13 - 19)  SpO2: 98% (07-09-25 @ 12:24) (96% - 100%)  Wt(kg): --    PHYSICAL EXAM:  GENERAL: NAD, well-groomed, well-developed  HEAD:  Atraumatic, Normocephalic  NECK: Supple, No JVD, Normal thyroid  CHEST/LUNG: Clear to percussion bilaterally; No rales, rhonchi, wheezing, or rubs  HEART: Regular rate and rhythm; No murmurs, rubs, or gallops  ABDOMEN: Soft, Nontender, Nondistended; Bowel sounds present  EXTREMITIES:  2+ Peripheral Pulses, No clubbing, cyanosis, or edema  SKIN: No rashes or lesions    CAPILLARY BLOOD GLUCOSE      POCT Blood Glucose.: 162 mg/dL (09 Jul 2025 12:01)  POCT Blood Glucose.: 105 mg/dL (09 Jul 2025 05:20)  POCT Blood Glucose.: 91 mg/dL (08 Jul 2025 23:33)  POCT Blood Glucose.: 137 mg/dL (08 Jul 2025 19:48)  POCT Blood Glucose.: 130 mg/dL (08 Jul 2025 18:51)                            12.8   7.00  )-----------( 178      ( 09 Jul 2025 05:30 )             40.7       CMP:  07-09 @ 05:30  SGPT 15  Albumin 3.1   Alk Phos 73   Anion Gap 9   SGOT 36   Total Bili 0.3   BUN 14   Calcium Total 8.6   CO2 25   Chloride 106   Creatinine 0.95   eGFR if AA --   eGFR if non AA --   Glucose 102   Potassium 4.2   Protein 6.3   Sodium 140      Thyroid Function Tests:      Diabetes Tests:       Radiology:

## 2025-07-09 NOTE — DIETITIAN INITIAL EVALUATION ADULT - REASON FOR ADMISSION
Altered mental status    Per HPI: Patient is 73 yo AAM who was brought in by EMS from [facility] for altered mental status.  Per EMS report patient's last known well was just prior to 5 PM when patient was next seen he was unresponsive.  EMS relates staff at [facility] found the patient to be hypoglycemic with a blood sugar of 49 they gave 2 doses of glucagon and oral glucose paste and blood glucose improved to 71 upon EMS arrival.  EMS reports they were told patient has dementia his baseline is awake and confused which is how the patient presented with EMS.  Patient poor historian secondary to dementia unsure why in ER denies any complaints.  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  - Not a candidate for mechanical thrombectomy w/ distal right vertebral artery occlusion- non amendable to thrombectomy.  - Follow up HgbA1c/ lipid profile - start HD atrovastatin daily   - Bedside dysphagia screen /PT / OT evaluation  - MRI brain w/o contrast  - TTE; r/o cardioembolic event /PFO  - Maintain permissive HTN for 24 hours  (08 Jul 2025 20:54)

## 2025-07-09 NOTE — PHYSICAL THERAPY INITIAL EVALUATION ADULT - ADDITIONAL COMMENTS
As per chart/SW note - Pt is a74 year old male who was at [facility] for respite care while his daughters are on vacation.  Pt admitted with AMS and was found unresponsive.  Pt has dementia.  SW spoke to patient's daughter Aide who states she is patients cdpap along with her sister.  Pt was supposed to be at SNF until 7/12 while his daughters are on vacation.   Pt was ambulating with walker and cane pta.  When home he receives HCPT with [facility] 2x week.  Plan will be to return to SNF for respite vs home with daughter's (daughter will be back Saturday7/13)

## 2025-07-09 NOTE — DISCHARGE NOTE NURSING/CASE MANAGEMENT/SOCIAL WORK - HAS THE PATIENT USED TOBACCO IN THE PAST 30 DAYS?
Notes recorded by Blanka Meyer MA on 3/26/2020 at 8:02 AM CDT  Called patient, left message in regards to results. Informed patient to give office call back for results.   Unable to assess due to patient's cognitive impairment

## 2025-07-09 NOTE — DISCHARGE NOTE PROVIDER - HOSPITAL COURSE
Problem/Plan - 1:  ·  Problem: Acute metabolic encephalopathy.   ·  Plan: likely 2/2 to hypoglycemic event - FS monitoring , hypoglycemia protocol , diabetic team evaluation requested , continue FS monitoring.    Problem/Plan - 2:  ·  Problem: Hypoglycemia.   ·  Plan: Accu-Cheks monitoring and insulin corrective regimen  sliding scale coverage with short acting insulin, add long-acting insulin as needed ,no concentrated sweets diet, serial labs ,HbA1C,education.    Problem/Plan - 3:  ·  Problem: Stroke-like symptoms.   ·  Plan: - Patient is not a TNK candidate given hx of old SDH/hygroma  noted on CTH and non-disabling focal exam, likely metabolic/hypoglycemic event.  - Not a candidate for mechanical thrombectomy w/ distal right vertebral artery occlusion- non amendable to thrombectomy.  - Follow up HgbA1c/ lipid profile - start HD Atorvastatin daily   - Bedside dysphagia screen /PT / OT evaluation  - Seen by neurologist 07/09 -no neurological event suspected , cleared for d/c , no aspirin advised.    Problem/Plan - 4:  ·  Problem: SDH (subdural hematoma).   ·  Plan: - Patient is not a TNK candidate given hx of old SDH/hygroma  noted on CTH and non-disabling focal exam, likely metabolic/hypoglycemic event.  - Not a candidate for mechanical thrombectomy w/ distal right vertebral artery occlusion- non amendable to thrombectomy.  - Follow up HgbA1c/ lipid profile - start HD Atorvastatin daily   - Bedside dysphagia screen /PT / OT evaluation  - Seen by neurologist 07/09 -no neurological event suspected , cleared for d/c.    Problem/Plan - 5:  ·  Problem: DM (diabetes mellitus), type 2.   ·  Plan: Hypoglycemia protocol , FS q 4 hrs Accu-Cheks monitoring and insulin corrective regimen  sliding scale coverage with short acting insulin, add long-acting insulin as needed ,no concentrated sweets diet, serial labs ,HbA1C,education.    Problem/Plan - 6:  ·  Problem: HTN (hypertension).   ·  Plan: - ECG with sinus rhythm and no evidence of ischemia/infarction  - Check echo  - Monitor on telemetry  - Cardiac enzymes negative  - CTA head and neck with hygroma vs. 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 no SDH), start aspirin 81 mg daily as per cardiology recommendation , D/w Dr Matos and clearance requested for BASA.    Problem/Plan - 7:  ·  Problem: BPH (benign prostatic hyperplasia).   ·  Plan: continue home medications.    Problem/Plan - 8:  ·  Problem: HLD (hyperlipidemia).   ·  Plan: continue home medications.    Problem/Plan - 9:  ·  Problem: GERD (gastroesophageal reflux disease).   ·  Plan: ppi.    Problem/Plan - 10:  ·  Problem: Anxiety.   ·  Plan; Supportive care ,frequent redirection ,continue home medications ,management of agitation as needed.    Problem/Plan - 11:  ·  Problem: Dementia.   ·  Plan: Supportive care ,frequent redirection ,continue home medications ,management of agitation as needed.    Problem/Plan - 12:  ·  Problem: Prophylactic measure.   ·  Plan: Gastrointestinal stress ulcer prophylaxis and DVT prophylaxis administered.

## 2025-07-09 NOTE — DISCHARGE NOTE PROVIDER - NSDCMRMEDTOKEN_GEN_ALL_CORE_FT
acetaminophen 500 mg oral tablet: 1 tab(s) orally once a day  alfuzosin 10 mg oral tablet, extended release: 1 tab(s) orally once a day (at bedtime)  Aspercreme Arthritis Pain 1% topical gel: Apply topically to affected area once a day apply to lower back  Basaglar KwikPen 100 units/mL subcutaneous solution: 12 unit(s) subcutaneous once a day  Combigan 0.2%-0.5% ophthalmic solution: 1 drop(s) in each affected eye 2 times a day each eye  finasteride 5 mg oral tablet: 1 tab(s) orally once a day  lisinopril 10 mg oral tablet: 1 tab(s) orally once a day  Lumigan 0.01% ophthalmic solution: 1 drop(s) in each affected eye once a day (at bedtime) each eye  metFORMIN 500 mg oral tablet: 1 tab(s) orally once a day  NovoLOG 100 units/mL subcutaneous solution: 4 unit(s) subcutaneous 3 times a day (with meals)  pantoprazole 40 mg oral delayed release tablet: 1 tab(s) orally once a day (in the morning)  Rhopressa 0.02% ophthalmic solution: 1 drop(s) in each eye 3 times a day each eye  risperiDONE 0.5 mg oral tablet: 1 tab(s) orally once a day   acetaminophen 500 mg oral tablet: 1 tab(s) orally once a day  alfuzosin 10 mg oral tablet, extended release: 1 tab(s) orally once a day (at bedtime)  Aspercreme Arthritis Pain 1% topical gel: Apply topically to affected area once a day apply to lower back  atorvastatin 80 mg oral tablet: 1 tab(s) orally once a day (at bedtime)  Basaglar KwikPen 100 units/mL subcutaneous solution: 12 unit(s) subcutaneous once a day  Combigan 0.2%-0.5% ophthalmic solution: 1 drop(s) in each affected eye 2 times a day each eye  finasteride 5 mg oral tablet: 1 tab(s) orally once a day  lisinopril 10 mg oral tablet: 1 tab(s) orally once a day  Lumigan 0.01% ophthalmic solution: 1 drop(s) in each affected eye once a day (at bedtime) each eye  metFORMIN 500 mg oral tablet: 1 tab(s) orally once a day  Multiple Vitamins oral tablet: 1 tab(s) orally once a day  NovoLOG 100 units/mL subcutaneous solution: 4 unit(s) subcutaneous 3 times a day (with meals)  pantoprazole 40 mg oral delayed release tablet: 1 tab(s) orally once a day (in the morning)  polyethylene glycol 3350 oral powder for reconstitution: 17 gram(s) orally once a day  Rhopressa 0.02% ophthalmic solution: 1 drop(s) in each eye 3 times a day each eye  risperiDONE 0.5 mg oral tablet: 1 tab(s) orally once a day  thiamine 100 mg oral tablet: 1 tab(s) orally once a day   acetaminophen 325 mg oral tablet: 2 tab(s) orally every 6 hours As needed Temp greater or equal to 38C (100.4F), Mild Pain (1 - 3)  alfuzosin 10 mg oral tablet, extended release: 1 tab(s) orally once a day (at bedtime)  amLODIPine 5 mg oral tablet: 1 tab(s) orally once a day hold for SBP below 110  Aspercreme Arthritis Pain 1% topical gel: Apply topically to affected area once a day apply to lower back  atorvastatin 80 mg oral tablet: 1 tab(s) orally once a day (at bedtime)  Combigan 0.2%-0.5% ophthalmic solution: 1 drop(s) in each affected eye 2 times a day each eye  finasteride 5 mg oral tablet: 1 tab(s) orally once a day  insulin glargine 100 units/mL subcutaneous solution: 12 unit(s) subcutaneous once a day (at bedtime)  lisinopril 40 mg oral tablet: 1 tab(s) orally once a day hold for SBP below 110  Lumigan 0.01% ophthalmic solution: 1 drop(s) in each affected eye once a day (at bedtime) each eye  metFORMIN 500 mg oral tablet: 1 tab(s) orally 2 times a day  Multiple Vitamins oral tablet: 1 tab(s) orally once a day  pantoprazole 40 mg oral delayed release tablet: 1 tab(s) orally once a day (in the morning)  polyethylene glycol 3350 oral powder for reconstitution: 17 gram(s) orally once a day  Rhopressa 0.02% ophthalmic solution: 1 drop(s) in each eye 3 times a day each eye  risperiDONE 0.5 mg oral tablet: 1 tab(s) orally once a day  thiamine 100 mg oral tablet: 1 tab(s) orally once a day

## 2025-07-09 NOTE — SWALLOW BEDSIDE ASSESSMENT ADULT - COMMENTS
"Patient is 75 yo AAM who was brought in by EMS from [facility] for altered mental status.  Per EMS report patient's last known well was just prior to 5 PM when patient was next seen he was unresponsive.  EMS relates staff at [facility] found the patient to be hypoglycemic with a blood sugar of 49 they gave 2 doses of glucagon and oral glucose paste and blood glucose improved to 71 upon EMS arrival.  EMS reports they were told patient has dementia his baseline is awake and confused which is how the patient presented with EMS.  Patient poor historian secondary to dementia unsure why in ER denies any complaints.  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  - Not a candidate for mechanical thrombectomy w/ distal right vertebral artery occlusion- non amendable to thrombectomy.  - Follow up HgbA1c/ lipid profile - start HD atrovastatin daily   - Bedside dysphagia screen /PT / OT evaluation  - MRI brain w/o contrast  - TTE; r/o cardioembolic event /PFO  - Maintain permissive HTN for 24 hours "    X-ray Chest 7/8/25: "IMPRESSION: No acute cardiopulmonary disease process."     CT Brain 7/8/25:   "IMPRESSION:  1.   Brain: No acute infarct or hemorrhage.   Encephalomalacia in the   left occipital lobe, suggestive of chronic infarct.  Small right   holohemispheric subdural fluid collection is present measuring up to 0.9   cm in width, may reflect a hygroma versus sequelae of old subdural   hematoma."

## 2025-07-09 NOTE — CARE COORDINATION ASSESSMENT. - NSPASTMEDSURGHISTORY_GEN_ALL_CORE_FT
PAST MEDICAL & SURGICAL HISTORY:  DM (diabetes mellitus), type 2      Glaucoma      Anxiety      BPH (benign prostatic hyperplasia)      GERD (gastroesophageal reflux disease)      Dementia      HLD (hyperlipidemia)      HTN (hypertension)      DM (diabetes mellitus), type 2

## 2025-07-09 NOTE — PROGRESS NOTE ADULT - SUBJECTIVE AND OBJECTIVE BOX
PROGRESS NOTE-SPCU BED 4   Patient is a 74y old  Male who presents with a chief complaint of   Chart and available morning labs /imaging are reviewed electronically , urgent issues addressed . More information  is being added upon completion of rounds , when more information is collected and management discussed with consultants , medical staff and social service/case management on the floor   OVERNIGHT  No new issues reported by medical staff . All above noted Patient is resting in a bed comfortably  .No distress noted   Passed dysphagia screen and having a breakfast , AAOX 2-3 forgefull Seen by neurologist - no acute neuro event suspected BG is WNL , Diabetic team eval requested and pending PT ordered ( patient is respite care and lives at home )  HPI:  Patient is 73 yo AAM who was brought in by EMS from [facility] for altered mental status.  Per EMS report patient's last known well was just prior to 5 PM when patient was next seen he was unresponsive.  EMS relates staff at [facility] found the patient to be hypoglycemic with a blood sugar of 49 they gave 2 doses of glucagon and oral glucose paste and blood glucose improved to 71 upon EMS arrival.  EMS reports they were told patient has dementia his baseline is awake and confused which is how the patient presented with EMS.  Patient poor historian secondary to dementia unsure why in ER denies any complaints.  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  - Not a candidate for mechanical thrombectomy w/ distal right vertebral artery occlusion- non amendable to thrombectomy.  - Follow up HgbA1c/ lipid profile - start HD atrovastatin daily   - Bedside dysphagia screen /PT / OT evaluation  - MRI brain w/o contrast  - TTE; r/o cardioembolic event /PFO  - Maintain permissive HTN for 24 hours  (08 Jul 2025 20:54)    PAST MEDICAL & SURGICAL HISTORY:  DM (diabetes mellitus), type 2      Glaucoma      DM (diabetes mellitus), type 2      HTN (hypertension)      HLD (hyperlipidemia)      Dementia      BPH (benign prostatic hyperplasia)      GERD (gastroesophageal reflux disease)      Anxiety          MEDICATIONS  (STANDING):  atorvastatin 80 milliGRAM(s) Oral at bedtime  dextrose 5%. 1000 milliLiter(s) (100 mL/Hr) IV Continuous <Continuous>  dextrose 5%. 1000 milliLiter(s) (50 mL/Hr) IV Continuous <Continuous>  dextrose 50% Injectable 25 Gram(s) IV Push once  dextrose 50% Injectable 12.5 Gram(s) IV Push once  dextrose 50% Injectable 25 Gram(s) IV Push once  dextrose Oral Gel 15 Gram(s) Oral once  finasteride 5 milliGRAM(s) Oral daily  glucagon  Injectable 1 milliGRAM(s) IntraMuscular once  latanoprost 0.005% Ophthalmic Solution 1 Drop(s) Both EYES at bedtime  lidocaine   4% Patch 1 Patch Transdermal daily  pantoprazole  Injectable 40 milliGRAM(s) IV Push daily  polyethylene glycol 3350 17 Gram(s) Oral daily  risperiDONE   Tablet 0.5 milliGRAM(s) Oral daily    MEDICATIONS  (PRN):  acetaminophen     Tablet .. 650 milliGRAM(s) Oral every 6 hours PRN Temp greater or equal to 38C (100.4F), Mild Pain (1 - 3)  acetaminophen   IVPB .. 1000 milliGRAM(s) IV Intermittent once PRN Temp greater or equal to 38.5C (101.3F), Moderate Pain (4 - 6)  aluminum hydroxide/magnesium hydroxide/simethicone Suspension 30 milliLiter(s) Oral every 4 hours PRN Dyspepsia  bisacodyl Suppository 10 milliGRAM(s) Rectal daily PRN Constipation  haloperidol    Injectable 0.5 milliGRAM(s) IntraMuscular every 6 hours PRN Agitation  melatonin 3 milliGRAM(s) Oral at bedtime PRN Insomnia  ondansetron Injectable 4 milliGRAM(s) IV Push every 8 hours PRN Nausea and/or Vomiting      OBJECTIVE    T(C): 36.9 (07-09-25 @ 08:32), Max: 37.1 (07-08-25 @ 21:36)  HR: 69 (07-09-25 @ 08:05) (68 - 94)  BP: 179/89 (07-09-25 @ 08:05) (128/84 - 215/93)  RR: 16 (07-09-25 @ 08:05) (13 - 19)  SpO2: 99% (07-09-25 @ 08:05) (96% - 100%)  Wt(kg): --  I&O's Summary    08 Jul 2025 07:01  -  09 Jul 2025 07:00  --------------------------------------------------------  IN: 350 mL / OUT: 0 mL / NET: 350 mL    09 Jul 2025 07:01  -  09 Jul 2025 08:56  --------------------------------------------------------  IN: 100 mL / OUT: 0 mL / NET: 100 mL          REVIEW OF SYSTEMS:  CONSTITUTIONAL: No fever, weight loss, or fatigue  EYES: No eye pain, visual disturbances, or discharge  ENMT:   No sinus or throat pain  NECK: No pain or stiffness  RESPIRATORY: No cough, wheezing, chills or hemoptysis; No shortness of breath  CARDIOVASCULAR: No chest pain, palpitations, dizziness, or leg swelling  GASTROINTESTINAL: No abdominal pain. No nausea, vomiting; No diarrhea or constipation. No melena or hematochezia.  GENITOURINARY: No dysuria, frequency, hematuria, or incontinence  NEUROLOGICAL: No headaches, memory loss, loss of strength, numbness, or tremors  SKIN: No itching, burning, rashes, or lesions   MUSCULOSKELETAL: No joint pain or swelling; No muscle, back, or extremity pain    PHYSICAL EXAM:  Appearance: NAD. VS past 24 hrs -as above   HEENT:   Moist oral mucosa. Conjunctiva clear b/l.   Neck : supple  Respiratory: Lungs CTAB.  Gastrointestinal:  Soft, nontender. No rebound. No rigidity. BS present	  Cardiovascular: RRR ,S1S2 present  Neurologic: Non-focal. Moving all extremities.  Extremities: No edema. No erythema. No calf tenderness.  Skin: No rashes, No ecchymoses, No cyanosis.	  wounds ,skin lesions-See skin assesment flow sheet   LABS:                        12.8   7.00  )-----------( 178      ( 09 Jul 2025 05:30 )             40.7     07-09    140  |  106  |  14  ----------------------------<  102[H]  4.2   |  25  |  0.95    Ca    8.6      09 Jul 2025 05:30    TPro  6.3  /  Alb  3.1[L]  /  TBili  0.3  /  DBili  0.1  /  AST  36  /  ALT  15  /  AlkPhos  73  07-09    CAPILLARY BLOOD GLUCOSE      POCT Blood Glucose.: 105 mg/dL (09 Jul 2025 05:20)  POCT Blood Glucose.: 91 mg/dL (08 Jul 2025 23:33)  POCT Blood Glucose.: 137 mg/dL (08 Jul 2025 19:48)  POCT Blood Glucose.: 130 mg/dL (08 Jul 2025 18:51)    PT/INR - ( 09 Jul 2025 05:30 )   PT: 11.4 sec;   INR: 0.98 ratio         PTT - ( 08 Jul 2025 18:16 )  PTT:30.0 sec  Urinalysis Basic - ( 09 Jul 2025 05:30 )    Color: x / Appearance: x / SG: x / pH: x  Gluc: 102 mg/dL / Ketone: x  / Bili: x / Urobili: x   Blood: x / Protein: x / Nitrite: x   Leuk Esterase: x / RBC: x / WBC x   Sq Epi: x / Non Sq Epi: x / Bacteria: x        RADIOLOGY & ADDITIONAL TESTS:   reviewed elctronically  < from: CT Angio Neck Stroke Protocol w/ IV Cont (07.08.25 @ 18:37) >  ACC: [id number] EXAM:  CT ANGIO BRAIN STROKE PROTC IC   ORDERED BY: MATY ERWIN     ACC: [id number] EXAM:  CT ANGIO NECK STROKE PROTCL IC   ORDERED BY: MATY ERWIN     ACC: [id number] EXAM:  CT BRAIN STROKE PROTOCOL   ORDERED BY: MATY ERWIN     PROCEDURE DATE:  07/08/2025          INTERPRETATION:  Exam Date: 7/8/2025 6:27 PM    Three examinations were performed on this patient:  1. CT Angiography of the carotid arteries with and without IV contrast  2. CT Angiography of the intracranial circulation with and without IV   contrast      CLINICAL INFORMATION:  Stroke Code    TECHNIQUE:   Preceding intravenous contrast contiguous axial 4 mm   sections were obtained through the head. CT angiography images were   acquired from the aortic arch to the vertex of the skull.   Images were   acquired during rapid bolus intravenous administration of 90 mL of   Omnipaque 350 contrast with 10 mL discarded.  3D, coronal, and sagittal   reformats were obtained.    COMPARISON:      None      FINDINGS:  CT head:    No acute infarct or hemorrhage is present. No significant edema is   identified. Encephalomalacia in the left occipital lobe, suggestive of   chronic infarct.  Small right holohemispheric subdural fluid collection   is present measuring up to 0.9 cm in width, may reflect a hygroma versus   sequelae of old subdural hematoma. No mass effect on underlying   parenchyma.  The ventricles, sulci and basal cisterns appear unremarkable.    The paranasal sinuses and mastoid air cells are clear.    Glaucoma device of the right globe with decreased size of the right   globe. Status post bilateral lens replacements.    CTA head:    There is atherosclerotic irregularity of the intradural right vertebral   artery with occlusion of the distal most segment of the right intradural   vertebral artery. Left intradural vertebral artery and basilar artery   appear unremarkable. Bilateral posterior cerebral arteries and superior   cerebellar arteries are unremarkable.    The intracranial internal carotid arteries, middle cerebral arteries, and   anterior cerebral arteries are intact without hemodynamically significant   stenosis.  There is no evidence of aneurysm or vascular malformation.    Dural venous sinuses are patent.    CTA neck:    The carotid circulation is intact without hemodynamically significant   stenosis. There are calcifications of bilateral carotid bulbs.  The   vertebral arteries are patent.      IMPRESSION:        1.   Brain: No acute infarct or hemorrhage.  Encephalomalacia in the   left occipital lobe, suggestive of chronic infarct.  Small right   holohemispheric subdural fluid collection is present measuring up to 0.9   cm in width, may reflect a hygroma versus sequelae of old subdural   hematoma.      Critical value:  I discussed the finding of this report with Dr. Erwin at 6:35 PM on 7/8/2025.  Critical value policy of the hospital   was followed.  Read back and confirmation of receipt of this   communication was performed.  This verbal communication supplements the   text report of this document.    2.   Intracranial circulation:  There is atherosclerotic irregularity of   the intradural right vertebral artery with occlusion of the distal most   segment of the right intradural vertebral artery. Left intradural   vertebral artery and basilar artery appear unremarkable. No   hemodynamically significant stenosis of the anterior circulation.          3.    Right carotid/vertebral artery system:  No hemodynamically   significant stenosis.        4.   Left carotid/vertebral artery system:  No hemodynamically   significant stenosis.    --- End of Report ---            < end of copied text >  < from: Xray Chest 1 View- PORTABLE-Urgent (07.08.25 @ 18:48) >  ACC: [id number] EXAM:  XR CHEST PORTABLE URGENT 1V   ORDERED BY: MATY ERWIN     PROCEDURE DATE:  07/08/2025          INTERPRETATION:  TECHNIQUE: Single portable view of the chest.    COMPARISON:  None    CLINICAL HISTORY: Stroke Code    FINDINGS:    Single frontal view of the chest demonstrates the lungs to be clear. The   cardiomediastinal silhouette is enlarged. No acute osseous abnormalities.      IMPRESSION: No acute cardiopulmonary disease process.    --- End of Report ---    < end of copied text >  45 minutes aggregate time was spent on this visit, 50% visit time spent in care co-ordination with other attendings and counselling patient .I have discussed care plan with patient / HCP/family member ,who expressed understanding of problems treatment and their effect and side effects, alternatives in details. I have asked if they have any questions and concerns and appropriately addressed them to best of my ability.

## 2025-07-09 NOTE — SOCIAL WORK PROGRESS NOTE - NSSWPROGRESSNOTE_GEN_ALL_CORE
GIUSEPPE referred patient back to [facility] where he is staying for respite while his daughters are on vacation.  Plan is for discharge Thursday 7/10 back to SNF until daughters return on Saturday 7/12.   GIUSEPPE spoke to admissions at [facility] who stated patient does not need insurance authorization to return.

## 2025-07-09 NOTE — PHYSICAL THERAPY INITIAL EVALUATION ADULT - PERTINENT HX OF CURRENT PROBLEM, REHAB EVAL
as per chart- Patient is 73 yo AAM who was brought in by EMS from [facility] for altered mental status.  Per EMS report patient's last known well was just prior to 5 PM when patient was next seen he was unresponsive.  EMS relates staff at [facility] found the patient to be hypoglycemic with a blood sugar of 49 they gave 2 doses of glucagon and oral glucose paste and blood glucose improved to 71 upon EMS arrival.  EMS reports they were told patient has dementia his baseline is awake and confused which is how the patient presented with EMS.  Patient poor historian secondary to dementia unsure why in ER denies any complaints.  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  - Not a candidate for mechanical thrombectomy w/ distal right vertebral artery occlusion- non amendable to thrombectomy.  - Follow up HgbA1c/ lipid profile - start HD atrovastatin daily   - Bedside dysphagia screen /PT / OT evaluation  - MRI brain w/o contrast  - TTE; r/o cardioembolic event /PFO  - Maintain permissive HTN for 24 hours

## 2025-07-09 NOTE — GOALS OF CARE CONVERSATION - ADVANCED CARE PLANNING - CONVERSATION DETAILS
Palliative care SW spoke with daughter Aide by phone (daughter Heaven also present with Aide) to discuss ACP. Daughter reports patient has a HCP with daughter Heaven as primary health care agent and daughter Aide as alternate agent. Patient has a prior MOLST on chart with DNR orders only. Reviewed prior wishes with daughter. She reports wishes for DNR remain the same and also wants to include DNI with a Trial of NIV. Explained that a new MOLST form would be completed. MOLST completed and to be placed on chart. All questions answered.

## 2025-07-09 NOTE — SWALLOW BEDSIDE ASSESSMENT ADULT - SWALLOW EVAL: PROGNOSIS
Dx continued: Pharyngeal stage suspected to be overall functional across consistencies as pharyngeal swallow trigger was suspected to be timely, hyolaryngeal excursion/elevation was appreciated upon palpation, and no overt s/s of aspiration or penetration noted. Suspect pt's absent dentition and suspected overall reduced cognition are further impacting pt presentation with minced and moist solids. At this time, a puree diet with thin liquids is recommended. Pt was left as received. His oral cavity was clear.

## 2025-07-09 NOTE — DIETITIAN INITIAL EVALUATION ADULT - ORAL INTAKE PTA/DIET HISTORY
Unable to obtain diet/weight hx from pt at this time. Pt is poor historian, lethargic, with altered mental status. Unable to reach staff at [facility] at this time to obtain diet hx. Per transfer docs, pt is on a no concentrated sweets diet, thin liquids, ground with soft cookies, cakes, sandwiches allowed, and evening diabetic snack bewteen 3-11pm.     No wts per HIE noted

## 2025-07-09 NOTE — DISCHARGE NOTE PROVIDER - CARE PROVIDER_API CALL
Bart Gonzalez-Ab  Neurology  [address] [place] [id number]  Phone: [phone]  Fax: [phone]  Follow Up Time:    Dakota Gonzalez  Neurology  [address] [place] [address]  Phone: [phone]  Fax: [phone]  Follow Up Time: 2 weeks    Perlman, Craig Douglas  Endocrinology Diabetes and Metabolism  [address], [address]  [place] [address]  Phone: [phone]  Fax: [phone]  Follow Up Time: 1 week    Sadiq Wright  Cardiology  [address], [address]  [place] [address]  Phone: [phone]  Fax: [phone]  Follow Up Time: 1 month

## 2025-07-09 NOTE — DISCHARGE NOTE NURSING/CASE MANAGEMENT/SOCIAL WORK - NSDCPEFALRISK_GEN_ALL_CORE
For information on Fall & Injury Prevention, visit: [email or web address] OR  [email or web address] OR  https://www.cdc.gov/steadi/patient.html

## 2025-07-09 NOTE — CONSULT NOTE ADULT - SUBJECTIVE AND OBJECTIVE BOX
HPI:  Patient is 75 yo AAM who was brought in by EMS from [facility] for altered mental status.  Per EMS report patient's last known well was just prior to 5 PM when patient was next seen he was unresponsive.  EMS relates staff at [facility] found the patient to be hypoglycemic with a blood sugar of 49 they gave 2 doses of glucagon and oral glucose paste and blood glucose improved to 71 upon EMS arrival.  EMS reports they were told patient has dementia his baseline is awake and confused which is how the patient presented with EMS.  Patient poor historian secondary to dementia unsure why in ER denies any complaints.  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  - Not a candidate for mechanical thrombectomy w/ distal right vertebral artery occlusion- non amendable to thrombectomy.  - Follow up HgbA1c/ lipid profile - start HD atrovastatin daily   - Bedside dysphagia screen /PT / OT evaluation  - MRI brain w/o contrast  - TTE; r/o cardioembolic event /PFO  - Maintain permissive HTN for 24 hours  (08 Jul 2025 20:54)    PERTINENT PM/SXH:   DM (diabetes mellitus), type 2    HTN (hypertension)    HLD (hyperlipidemia)    Dementia    GERD (gastroesophageal reflux disease)    BPH (benign prostatic hyperplasia)    Anxiety    Glaucoma    DM (diabetes mellitus), type 2        FAMILY HISTORY:    Family Hx substance abuse [ ]yes [ ]no  ITEMS NOT CHECKED ARE NOT PRESENT    SOCIAL HISTORY:   Significant other/partner[ ]  Children[ ]  Zoroastrianism/Spirituality:  Substance hx:  [ ]   Tobacco hx:  [ ]   Alcohol hx: [ ]   Home Opioid hx:  [ ] I-Stop Reference No:  Living Situation: [ ]Home  [ ]Long term care  [ ]Rehab [ ]Other    ADVANCE DIRECTIVES:    DNR/MOLST  [ ]  Living Will  [ ]   DECISION MAKER(s):  [ ] Health Care Proxy(s)  [ ] Surrogate(s)  [ ] Guardian           Name(s): Phone Number(s):    BASELINE (I)ADL(s) (prior to admission):  [place]: [ ]Total  [ ] Moderate [ ]Dependent    Allergies    Allergy Status Unknown    Intolerances    MEDICATIONS  (STANDING):  atorvastatin 80 milliGRAM(s) Oral at bedtime  dextrose 5%. 1000 milliLiter(s) (100 mL/Hr) IV Continuous <Continuous>  dextrose 5%. 1000 milliLiter(s) (50 mL/Hr) IV Continuous <Continuous>  dextrose 50% Injectable 25 Gram(s) IV Push once  dextrose 50% Injectable 12.5 Gram(s) IV Push once  dextrose 50% Injectable 25 Gram(s) IV Push once  dextrose Oral Gel 15 Gram(s) Oral once  finasteride 5 milliGRAM(s) Oral daily  glucagon  Injectable 1 milliGRAM(s) IntraMuscular once  latanoprost 0.005% Ophthalmic Solution 1 Drop(s) Both EYES at bedtime  lidocaine   4% Patch 1 Patch Transdermal daily  lisinopril 10 milliGRAM(s) Oral daily  pantoprazole  Injectable 40 milliGRAM(s) IV Push daily  polyethylene glycol 3350 17 Gram(s) Oral daily  risperiDONE   Tablet 0.5 milliGRAM(s) Oral daily    MEDICATIONS  (PRN):  acetaminophen     Tablet .. 650 milliGRAM(s) Oral every 6 hours PRN Temp greater or equal to 38C (100.4F), Mild Pain (1 - 3)  acetaminophen   IVPB .. 1000 milliGRAM(s) IV Intermittent once PRN Temp greater or equal to 38.5C (101.3F), Moderate Pain (4 - 6)  aluminum hydroxide/magnesium hydroxide/simethicone Suspension 30 milliLiter(s) Oral every 4 hours PRN Dyspepsia  bisacodyl Suppository 10 milliGRAM(s) Rectal daily PRN Constipation  haloperidol    Injectable 0.5 milliGRAM(s) IntraMuscular every 6 hours PRN Agitation  hydrALAZINE 10 milliGRAM(s) Oral every 8 hours PRN Systolic blood pressure >170  melatonin 3 milliGRAM(s) Oral at bedtime PRN Insomnia  ondansetron Injectable 4 milliGRAM(s) IV Push every 8 hours PRN Nausea and/or Vomiting    PRESENT SYMPTOMS: [ ]Unable to self-report  [ ] CPOT [ ] PAINADs [ ] RDOS  Source if other than patient:  [ ]Family   [ ]Team     Pain: [ ]yes [ ]no  QOL impact -   Location -                    Aggravating factors -  Quality -  Radiation -  Timing-  Severity (0-10 scale):  Minimal acceptable level (0-10 scale):     CPOT:    https://www.[facility].org/getattachment/[email or web address]/Critical-Care-Pain-Observation-Tool-(CPOT)    PAIN AD Score:   http://geriatrictoolkit.[facility]/cog/painad.pdf (press ctrl +  left click to view)    Dyspnea:                           [ ]Mild [ ]Moderate [ ]Severe      RDOS:  0 to 2  minimal or no respiratory distress   3  mild distress  4 to 6 moderate distress  >7 severe distress  https://[email or web address].net/handouts/hen/Respiratory_Distress_Observation_Scale.pdf (Ctrl +  left click to view)     Anxiety:                             [ ]Mild [ ]Moderate [ ]Severe  Fatigue:                             [ ]Mild [ ]Moderate [ ]Severe  Nausea:                             [ ]Mild [ ]Moderate [ ]Severe  Loss of appetite:              [ ]Mild [ ]Moderate [ ]Severe  Constipation:                    [ ]Mild [ ]Moderate [ ]Severe    PCSSQ[Palliative Care Spiritual Screening Question]   Severity (0-10):  Score of 4 or > indicate consideration of Chaplaincy referral.  Chaplaincy Referral: [ ] yes [ ] refused [ ] following [ ] Deferred     Caregiver [place]? : [ ] yes [ ] no [ ] Deferred [ ] Declined             Social work referral [ ] Patient & Family Centered Care Referral [ ]     Anticipatory Grief present?:  [ ] yes [ ] no  [ ] Deferred                  Social work referral [ ] Chaplaincy Referral[ ]      Other Symptoms:  [ ]All other review of systems negative     Palliative Performance Status Version 2:         %    http://npcrc.org/files/news/palliative_performance_scale_ppsv2.pdf  PHYSICAL EXAM:  Vital Signs Last 24 Hrs  T(C): 36.9 (09 Jul 2025 08:32), Max: 37.1 (08 Jul 2025 21:36)  T(F): 98.5 (09 Jul 2025 08:32), Max: 98.7 (08 Jul 2025 21:36)  HR: 69 (09 Jul 2025 08:05) (68 - 94)  BP: 179/89 (09 Jul 2025 08:05) (128/84 - 215/93)  BP(mean): 114 (09 Jul 2025 08:05) (101 - 123)  RR: 16 (09 Jul 2025 08:05) (13 - 19)  SpO2: 99% (09 Jul 2025 08:05) (96% - 100%)    Parameters below as of 09 Jul 2025 08:05  Patient On (Oxygen Delivery Method): room air     I&O's Summary    08 Jul 2025 07:01  -  09 Jul 2025 07:00  --------------------------------------------------------  IN: 350 mL / OUT: 0 mL / NET: 350 mL    09 Jul 2025 07:01  -  09 Jul 2025 11:20  --------------------------------------------------------  IN: 100 mL / OUT: 0 mL / NET: 100 mL      GENERAL: [ ]Cachexia    [ ]Alert  [ ]Oriented x   [ ]Lethargic  [ ]Unarousable  [ ]Verbal  [ ]Non-Verbal  Behavioral:   [ ] Anxiety  [ ] Delirium [ ] Agitation [ ] Other  HEENT:  [ ]Normal   [ ]Dry mouth   [ ]ET Tube/Trach  [ ]Oral lesions  PULMONARY:   [ ]Clear [ ]Tachypnea  [ ]Audible excessive secretions   [ ]Rhonchi        [ ]Right [ ]Left [ ]Bilateral  [ ]Crackles        [ ]Right [ ]Left [ ]Bilateral  [ ]Wheezing     [ ]Right [ ]Left [ ]Bilateral  [ ]Diminished breath sounds [ ]right [ ]left [ ]bilateral  CARDIOVASCULAR:    [ ]Regular [ ]Irregular [ ]Tachy  [ ]Alvarado [ ]Murmur [ ]Other  GASTROINTESTINAL:  [ ]Soft  [ ]Distended   [ ]+BS  [ ]Non tender [ ]Tender  [ ]Other [ ]PEG [ ]OGT/ NGT  Last BM:  GENITOURINARY:  [ ]Normal [ ] Incontinent   [ ]Oliguria/Anuria   [ ]Holland  MUSCULOSKELETAL:   [ ]Normal   [ ]Weakness  [ ]Bed/Wheelchair bound [ ]Edema  NEUROLOGIC:   [ ]No focal deficits  [ ]Cognitive impairment  [ ]Dysphagia [ ]Dysarthria [ ]Paresis [ ]Other   SKIN:   [ ]Normal  [ ]Rash  [ ]Other  [ ]Pressure ulcer(s)       Present on admission [ ]y [ ]n    CRITICAL CARE:  [ ] Shock Present  [ ]Septic [ ]Cardiogenic [ ]Neurologic [ ]Hypovolemic  [ ]  Vasopressors [ ]  Inotropes   [ ]Respiratory failure present [ ]Mechanical ventilation [ ]Non-invasive ventilatory support [ ]High flow    [ ]Acute  [ ]Chronic [ ]Hypoxic  [ ]Hypercarbic [ ]Other  [ ]Other organ failure     LABS:                        12.8   7.00  )-----------( 178      ( 09 Jul 2025 05:30 )             40.7   07-09    140  |  106  |  14  ----------------------------<  102[H]  4.2   |  25  |  0.95    Ca    8.6      09 Jul 2025 05:30    TPro  6.3  /  Alb  3.1[L]  /  TBili  0.3  /  DBili  0.1  /  AST  36  /  ALT  15  /  AlkPhos  73  07-09  PT/INR - ( 09 Jul 2025 05:30 )   PT: 11.4 sec;   INR: 0.98 ratio         PTT - ( 08 Jul 2025 18:16 )  PTT:30.0 sec    Urinalysis Basic - ( 09 Jul 2025 05:30 )    Color: x / Appearance: x / SG: x / pH: x  Gluc: 102 mg/dL / Ketone: x  / Bili: x / Urobili: x   Blood: x / Protein: x / Nitrite: x   Leuk Esterase: x / RBC: x / WBC x   Sq Epi: x / Non Sq Epi: x / Bacteria: x      RADIOLOGY & ADDITIONAL STUDIES:    PROTEIN CALORIE MALNUTRITION PRESENT: [ ]mild [ ]moderate [ ]severe [ ]underweight [ ]morbid obesity  https://www.andeal.org/vault/7650/web/files/ONC/Table_Clinical%20Characteristics%20to%20Document%20Malnutrition-White%20JV%20et%20al%202012.pdf    Height (cm): 170.2 (07-08-25 @ 18:17)  Weight (kg): 53.3 (07-08-25 @ 18:17)  BMI (kg/m2): 18.4 (07-08-25 @ 18:17)    [ ]PPSV2 < or = to 30% [ ]significant weight loss  [ ]poor nutritional intake  [ ]anasarca[ ]Artificial Nutrition      Other REFERRALS:  [ ]Hospice  [ ]Child Life  [ ]Social Work  [ ]Case management [ ]Holistic Therapy      HPI:  Patient is 73 yo AAM who was brought in by EMS from [facility] for altered mental status.  Per EMS report patient's last known well was just prior to 5 PM when patient was next seen he was unresponsive.  EMS relates staff at [facility] found the patient to be hypoglycemic with a blood sugar of 49 they gave 2 doses of glucagon and oral glucose paste and blood glucose improved to 71 upon EMS arrival.  EMS reports they were told patient has dementia his baseline is awake and confused which is how the patient presented with EMS.  Patient poor historian secondary to dementia unsure why in ER denies any complaints.  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  - Not a candidate for mechanical thrombectomy w/ distal right vertebral artery occlusion- non amendable to thrombectomy.  - Follow up HgbA1c/ lipid profile - start HD atrovastatin daily   - Bedside dysphagia screen /PT / OT evaluation  - MRI brain w/o contrast  - TTE; r/o cardioembolic event /PFO  - Maintain permissive HTN for 24 hours  (08 Jul 2025 20:54)    PERTINENT PM/SXH:   DM (diabetes mellitus), type 2    HTN (hypertension)    HLD (hyperlipidemia)    Dementia    GERD (gastroesophageal reflux disease)    BPH (benign prostatic hyperplasia)    Anxiety    Glaucoma    DM (diabetes mellitus), type 2        FAMILY HISTORY: no known hx in first degree relatives    Family Hx substance abuse [ ]yes [x ]no  ITEMS NOT CHECKED ARE NOT PRESENT    SOCIAL HISTORY:   Significant other/partner[ ]  Children[ ]  Nondenominational/Spirituality:  Substance hx:  [ ]   Tobacco hx:  [ ]   Alcohol hx: [ ]   Home Opioid hx:  [ ] I-Stop Reference No:  Living Situation: [ ]Home  [ x]Long term care  [ ]Rehab [ ]Other    ADVANCE DIRECTIVES:    DNR/MOLST  [ x]  Living Will  [ ]   DECISION MAKER(s):  [ ] Health Care Proxy(s)  [ x] Surrogate(s)  [ ] Guardian           Name(s): Phone Number(s): Aide and Heaven, daughters, number per EMR    BASELINE (I)ADL(s) (prior to admission):  [place]: [ ]Total  [ x] Moderate [ ]Dependent    Allergies    Allergy Status Unknown    Intolerances    MEDICATIONS  (STANDING):  atorvastatin 80 milliGRAM(s) Oral at bedtime  dextrose 5%. 1000 milliLiter(s) (100 mL/Hr) IV Continuous <Continuous>  dextrose 5%. 1000 milliLiter(s) (50 mL/Hr) IV Continuous <Continuous>  dextrose 50% Injectable 25 Gram(s) IV Push once  dextrose 50% Injectable 12.5 Gram(s) IV Push once  dextrose 50% Injectable 25 Gram(s) IV Push once  dextrose Oral Gel 15 Gram(s) Oral once  finasteride 5 milliGRAM(s) Oral daily  glucagon  Injectable 1 milliGRAM(s) IntraMuscular once  latanoprost 0.005% Ophthalmic Solution 1 Drop(s) Both EYES at bedtime  lidocaine   4% Patch 1 Patch Transdermal daily  lisinopril 10 milliGRAM(s) Oral daily  pantoprazole  Injectable 40 milliGRAM(s) IV Push daily  polyethylene glycol 3350 17 Gram(s) Oral daily  risperiDONE   Tablet 0.5 milliGRAM(s) Oral daily    MEDICATIONS  (PRN):  acetaminophen     Tablet .. 650 milliGRAM(s) Oral every 6 hours PRN Temp greater or equal to 38C (100.4F), Mild Pain (1 - 3)  acetaminophen   IVPB .. 1000 milliGRAM(s) IV Intermittent once PRN Temp greater or equal to 38.5C (101.3F), Moderate Pain (4 - 6)  aluminum hydroxide/magnesium hydroxide/simethicone Suspension 30 milliLiter(s) Oral every 4 hours PRN Dyspepsia  bisacodyl Suppository 10 milliGRAM(s) Rectal daily PRN Constipation  haloperidol    Injectable 0.5 milliGRAM(s) IntraMuscular every 6 hours PRN Agitation  hydrALAZINE 10 milliGRAM(s) Oral every 8 hours PRN Systolic blood pressure >170  melatonin 3 milliGRAM(s) Oral at bedtime PRN Insomnia  ondansetron Injectable 4 milliGRAM(s) IV Push every 8 hours PRN Nausea and/or Vomiting    PRESENT SYMPTOMS: [ ]Unable to self-report  [ ] CPOT [ ] PAINADs [ ] RDOS  Source if other than patient:  [ ]Family   [ ]Team     Pain: [ ]yes [x ]no  QOL impact -   Location -                    Aggravating factors -  Quality -  Radiation -  Timing-  Severity (0-10 scale):  Minimal acceptable level (0-10 scale):     CPOT:    https://www.sccm.org/getattachment/[email or web address]/Critical-Care-Pain-Observation-Tool-(CPOT)    PAIN AD Score:   http://geriatrictoolkit.[facility]/cog/painad.pdf (press ctrl +  left click to view)    Dyspnea:                           [ ]Mild [ ]Moderate [ ]Severe      RDOS:  0 to 2  minimal or no respiratory distress   3  mild distress  4 to 6 moderate distress  >7 severe distress  https://[email or web address].net/handouts/hen/Respiratory_Distress_Observation_Scale.pdf (Ctrl +  left click to view)     Anxiety:                             [ ]Mild [ ]Moderate [ ]Severe  Fatigue:                             [ ]Mild [ ]Moderate [ ]Severe  Nausea:                             [ ]Mild [ ]Moderate [ ]Severe  Loss of appetite:              [ ]Mild [ ]Moderate [ ]Severe  Constipation:                    [ ]Mild [ ]Moderate [ ]Severe    PCSSQ[Palliative Care Spiritual Screening Question]   Severity (0-10):  Score of 4 or > indicate consideration of Chaplaincy referral.  Chaplaincy Referral: [ ] yes [ ] refused [ ] following [x ] Deferred     Caregiver [place]? : [ ] yes [x ] no [ ] Deferred [ ] Declined             Social work referral [ ] Patient & Family Centered Care Referral [ ]     Anticipatory Grief present?:  [ ] yes [x ] no  [ ] Deferred                  Social work referral [ ] Chaplaincy Referral[ ]      Other Symptoms:  [ ]All other review of systems negative     Palliative Performance Status Version 2:     30-40    %    http://[facility].org/files/news/palliative_performance_scale_ppsv2.pdf  PHYSICAL EXAM:  Vital Signs Last 24 Hrs  T(C): 36.9 (09 Jul 2025 08:32), Max: 37.1 (08 Jul 2025 21:36)  T(F): 98.5 (09 Jul 2025 08:32), Max: 98.7 (08 Jul 2025 21:36)  HR: 69 (09 Jul 2025 08:05) (68 - 94)  BP: 179/89 (09 Jul 2025 08:05) (128/84 - 215/93)  BP(mean): 114 (09 Jul 2025 08:05) (101 - 123)  RR: 16 (09 Jul 2025 08:05) (13 - 19)  SpO2: 99% (09 Jul 2025 08:05) (96% - 100%)    Parameters below as of 09 Jul 2025 08:05  Patient On (Oxygen Delivery Method): room air     I&O's Summary    08 Jul 2025 07:01  -  09 Jul 2025 07:00  --------------------------------------------------------  IN: 350 mL / OUT: 0 mL / NET: 350 mL    09 Jul 2025 07:01  -  09 Jul 2025 11:20  --------------------------------------------------------  IN: 100 mL / OUT: 0 mL / NET: 100 mL      GENERAL: [ ]Cachexia    [ x]Alert  [x]Oriented x 1  [ ]Lethargic  [ ]Unarousable  [ ]Verbal  [ ]Non-Verbal  Behavioral:   [ ] Anxiety  [ ] Delirium [ ] Agitation [ ] Other  HEENT:  [ x]Normal   [ ]Dry mouth   [ ]ET Tube/Trach  [ ]Oral lesions  PULMONARY:   [x ]Clear [ ]Tachypnea  [ ]Audible excessive secretions   [ ]Rhonchi        [ ]Right [ ]Left [ ]Bilateral  [ ]Crackles        [ ]Right [ ]Left [ ]Bilateral  [ ]Wheezing     [ ]Right [ ]Left [ ]Bilateral  [ ]Diminished breath sounds [ ]right [ ]left [ ]bilateral  CARDIOVASCULAR:    [x ]Regular [ ]Irregular [ ]Tachy  [ ]Alvarado [ ]Murmur [ ]Other  GASTROINTESTINAL:  [ x]Soft  [ ]Distended   [ ]+BS  [ ]Non tender [ ]Tender  [ ]Other [ ]PEG [ ]OGT/ NGT  Last BM:  GENITOURINARY:  [ ]Normal [x ] Incontinent   [ ]Oliguria/Anuria   [ ]Holland  MUSCULOSKELETAL:   [ ]Normal   [ x]Weakness  [ ]Bed/Wheelchair bound [ ]Edema  NEUROLOGIC:   [ ]No focal deficits  [x ]Cognitive impairment  [ ]Dysphagia [ ]Dysarthria [ ]Paresis [ ]Other   SKIN:   [ ]Normal  [ ]Rash  [ ]Other  [ ]Pressure ulcer(s)       Present on admission [ ]y [ ]n    CRITICAL CARE:  [ ] Shock Present  [ ]Septic [ ]Cardiogenic [ ]Neurologic [ ]Hypovolemic  [ ]  Vasopressors [ ]  Inotropes   [ ]Respiratory failure present [ ]Mechanical ventilation [ ]Non-invasive ventilatory support [ ]High flow    [ ]Acute  [ ]Chronic [ ]Hypoxic  [ ]Hypercarbic [ ]Other  [ ]Other organ failure     LABS:                        12.8   7.00  )-----------( 178      ( 09 Jul 2025 05:30 )             40.7   07-09    140  |  106  |  14  ----------------------------<  102[H]  4.2   |  25  |  0.95    Ca    8.6      09 Jul 2025 05:30    TPro  6.3  /  Alb  3.1[L]  /  TBili  0.3  /  DBili  0.1  /  AST  36  /  ALT  15  /  AlkPhos  73  07-09  PT/INR - ( 09 Jul 2025 05:30 )   PT: 11.4 sec;   INR: 0.98 ratio         PTT - ( 08 Jul 2025 18:16 )  PTT:30.0 sec    Urinalysis Basic - ( 09 Jul 2025 05:30 )    Color: x / Appearance: x / SG: x / pH: x  Gluc: 102 mg/dL / Ketone: x  / Bili: x / Urobili: x   Blood: x / Protein: x / Nitrite: x   Leuk Esterase: x / RBC: x / WBC x   Sq Epi: x / Non Sq Epi: x / Bacteria: x      RADIOLOGY & ADDITIONAL STUDIES:  < from: CT Angio Neck Stroke Protocol w/ IV Cont (07.08.25 @ 18:37) >    ACC: [id number] EXAM:  CT ANGIO BRAIN STROKE PROTC IC   ORDERED BY: MATY ERWIN     ACC: [id number] EXAM:  CT ANGIO NECK STROKE PROTCL IC   ORDERED BY: MATY ERWIN     ACC: [id number] EXAM:  CT BRAIN STROKE PROTOCOL   ORDERED BY: MATY ERWIN     PROCEDURE DATE:  07/08/2025          INTERPRETATION:  Exam Date: 7/8/2025 6:27 PM    Three examinations were performed on this patient:  1. CT Angiography of the carotid arteries with and without IV contrast  2. CT Angiography of the intracranial circulation with and without IV   contrast      CLINICAL INFORMATION:  Stroke Code    TECHNIQUE:   Preceding intravenous contrast contiguous axial 4 mm   sections were obtained through the head. CT angiography images were   acquired from the aortic arch to the vertex of the skull.   Images were   acquired during rapid bolus intravenous administration of 90 mL of   Omnipaque 350 contrast with 10 mL discarded.  3D, coronal, and sagittal   reformats were obtained.    COMPARISON:      None      FINDINGS:  CT head:    No acute infarct or hemorrhage is present. No significant edema is   identified. Encephalomalacia in the left occipital lobe, suggestive of   chronic infarct.  Small right holohemispheric subdural fluid collection   is present measuring up to 0.9 cm in width, may reflect a hygroma versus   sequelae of old subdural hematoma. No mass effect on underlying   parenchyma.  The ventricles, sulci and basal cisterns appear unremarkable.    The paranasal sinuses and mastoid air cells are clear.    Glaucoma device of the right globe with decreased size of the right   globe. Status post bilateral lens replacements.    CTA head:    There is atherosclerotic irregularity of the intradural right vertebral   artery with occlusion of the distal most segment of the right intradural   vertebral artery. Left intradural vertebral artery and basilar artery   appear unremarkable. Bilateral posterior cerebral arteries and superior   cerebellar arteries are unremarkable.    The intracranial internal carotid arteries, middle cerebral arteries, and   anterior cerebral arteries are intact without hemodynamically significant   stenosis.  There is no evidence of aneurysm or vascular malformation.    Dural venous sinuses are patent.    CTA neck:    The carotid circulation is intact without hemodynamically significant   stenosis. There are calcifications of bilateral carotid bulbs.  The   vertebral arteries are patent.      IMPRESSION:        1.   Brain: No acute infarct or hemorrhage.  Encephalomalacia in the   left occipital lobe, suggestive of chronic infarct.  Small right   holohemispheric subdural fluid collection is present measuring up to 0.9   cm in width, may reflect a hygroma versus sequelae of old subdural   hematoma.      Critical value:  I discussed the finding of this report with Dr. Erwin at 6:35 PM on 7/8/2025.  Critical value policy of the hospital   was followed.  Read back and confirmation of receipt of this   communication was performed.  This verbal communication supplements the   text report of this document.    2.   Intracranial circulation:  There is atherosclerotic irregularity of   the intradural right vertebral artery with occlusion of the distal most   segment of the right intradural vertebral artery. Left intradural   vertebral artery and basilar artery appear unremarkable. No   hemodynamically significant stenosis of the anterior circulation.          3.    Right carotid/vertebral artery system:  No hemodynamically   significant stenosis.        4.   Left carotid/vertebral artery system:  No hemodynamically   significant stenosis.    --- End of Report ---            SHIVAM VASQUEZ MD; Attending Radiologist  This document has been electronically signed. Jul 8 2025  7:07PM    < end of copied text >      PROTEIN CALORIE MALNUTRITION PRESENT: [ ]mild [ ]moderate [ ]severe [ ]underweight [ ]morbid obesity  https://www.andeal.org/vault/2440/web/files/ONC/Table_Clinical%20Characteristics%20to%20Document%20Malnutrition-White%20JV%20et%20al%202012.pdf    Height (cm): 170.2 (07-08-25 @ 18:17)  Weight (kg): 53.3 (07-08-25 @ 18:17)  BMI (kg/m2): 18.4 (07-08-25 @ 18:17)    [x ]PPSV2 < or = to 30% [ ]significant weight loss  [x ]poor nutritional intake  [ ]anasarca[ ]Artificial Nutrition      Other REFERRALS:  [ ]Hospice  [ ]Child Life  [x ]Social Work  [ ]Case management [ ]Holistic Therapy

## 2025-07-09 NOTE — CONSULT NOTE ADULT - PROBLEM SELECTOR RECOMMENDATION 3
Thank you for involving us in the care of this patient.  we will sign off as goals established.    Maggie Bird MD, [facility]; Palliative Care Attending, [phone]

## 2025-07-09 NOTE — CONSULT NOTE ADULT - SUBJECTIVE AND OBJECTIVE BOX
History of Present Illness: The patient is a 74 year old male with a history of HTN, HL, DM, BPH, dementia who presents with AMS. The patient is a poor historian. He has no current complaints. He reportedly had an unresponsive episode at NH. He was noted to be hypoglycemic at the time.    Past Medical/Surgical History:  HTN, HL, DM, BPH, dementia    Medications:  Home Medications:  acetaminophen 500 mg oral tablet: 1 tab(s) orally once a day (08 Jul 2025 20:44)  alfuzosin 10 mg oral tablet, extended release: 1 tab(s) orally once a day (at bedtime) (08 Jul 2025 20:44)  Aspercreme Arthritis Pain 1% topical gel: Apply topically to affected area once a day apply to lower back (08 Jul 2025 20:44)  Basaglar KwikPen 100 units/mL subcutaneous solution: 12 unit(s) subcutaneous once a day (08 Jul 2025 20:44)  Combigan 0.2%-0.5% ophthalmic solution: 1 drop(s) in each affected eye 2 times a day each eye (08 Jul 2025 20:44)  finasteride 5 mg oral tablet: 1 tab(s) orally once a day (08 Jul 2025 20:44)  lisinopril 10 mg oral tablet: 1 tab(s) orally once a day (08 Jul 2025 20:44)  Lumigan 0.01% ophthalmic solution: 1 drop(s) in each affected eye once a day (at bedtime) each eye (08 Jul 2025 20:46)  metFORMIN 500 mg oral tablet: 1 tab(s) orally once a day (08 Jul 2025 20:44)  NovoLOG 100 units/mL subcutaneous solution: 4 unit(s) subcutaneous 3 times a day (with meals) (08 Jul 2025 20:44)  pantoprazole 40 mg oral delayed release tablet: 1 tab(s) orally once a day (in the morning) (08 Jul 2025 20:44)  Rhopressa 0.02% ophthalmic solution: 1 drop(s) in each eye 3 times a day each eye (08 Jul 2025 20:44)  risperiDONE 0.5 mg oral tablet: 1 tab(s) orally once a day (08 Jul 2025 20:44)      Family History: Non-contributory family history of premature cardiovascular atherosclerotic disease    Social History: No tobacco, alcohol or drug use    Review of Systems:  General: No fevers, chills, weight gain  Skin: No rashes, color changes  Cardiovascular: No chest pain, orthopnea  Respiratory: No shortness of breath, cough  Gastrointestinal: No nausea, abdominal pain  Genitourinary: No incontinence, pain with urination  Musculoskeletal: No pain, swelling, decreased range of motion  Neurological: No headache, weakness  Psychiatric: No depression, anxiety  Endocrine: No weight gain, increased thirst  All other systems are comprehensively negative.    Physical Exam:  Vitals:        Vital Signs Last 24 Hrs  T(C): 36.9 (09 Jul 2025 08:32), Max: 37.1 (08 Jul 2025 21:36)  T(F): 98.5 (09 Jul 2025 08:32), Max: 98.7 (08 Jul 2025 21:36)  HR: 69 (09 Jul 2025 08:05) (68 - 94)  BP: 179/89 (09 Jul 2025 08:05) (128/84 - 215/93)  BP(mean): 114 (09 Jul 2025 08:05) (101 - 123)  RR: 16 (09 Jul 2025 08:05) (13 - 19)  SpO2: 99% (09 Jul 2025 08:05) (96% - 100%)    Parameters below as of 09 Jul 2025 08:05  Patient On (Oxygen Delivery Method): room air      General: NAD  HEENT: MMM  Neck: No JVD, no carotid bruit  Lungs: CTAB  CV: RRR, nl S1/S2, no M/R/G  Abdomen: S/NT/ND, +BS  Extremities: No LE edema, no cyanosis  Neuro: AAOx3, non-focal  Skin: No rash    Labs:                        12.8   7.00  )-----------( 178      ( 09 Jul 2025 05:30 )             40.7     07-09    140  |  106  |  14  ----------------------------<  102[H]  4.2   |  25  |  0.95    Ca    8.6      09 Jul 2025 05:30    TPro  6.3  /  Alb  3.1[L]  /  TBili  0.3  /  DBili  0.1  /  AST  36  /  ALT  15  /  AlkPhos  73  07-09        PT/INR - ( 09 Jul 2025 05:30 )   PT: 11.4 sec;   INR: 0.98 ratio         PTT - ( 08 Jul 2025 18:16 )  PTT:30.0 sec    ECG/Telemetry: NSR, normal axis, no ST abnormality

## 2025-07-09 NOTE — PROGRESS NOTE ADULT - SUBJECTIVE AND OBJECTIVE BOX
CHIEF COMPLAINT/ REASON FOR VISIT  .. Patient was seen to address the  issue listed under PROBLEM LIST which is located toward bottom of this note     [facility]    Allergies    Allergy Status Unknown    Intolerances        PAST MEDICAL & SURGICAL HISTORY:  DM (diabetes mellitus), type 2      HTN (hypertension)      HLD (hyperlipidemia)      Dementia      GERD (gastroesophageal reflux disease)      BPH (benign prostatic hyperplasia)      Anxiety      Glaucoma      DM (diabetes mellitus), type 2          FAMILY HISTORY:      Home Medications:  acetaminophen 500 mg oral tablet: 1 tab(s) orally once a day (08 Jul 2025 20:44)  alfuzosin 10 mg oral tablet, extended release: 1 tab(s) orally once a day (at bedtime) (08 Jul 2025 20:44)  Aspercreme Arthritis Pain 1% topical gel: Apply topically to affected area once a day apply to lower back (08 Jul 2025 20:44)  Basaglar KwikPen 100 units/mL subcutaneous solution: 12 unit(s) subcutaneous once a day (08 Jul 2025 20:44)  Combigan 0.2%-0.5% ophthalmic solution: 1 drop(s) in each affected eye 2 times a day each eye (08 Jul 2025 20:44)  finasteride 5 mg oral tablet: 1 tab(s) orally once a day (08 Jul 2025 20:44)  lisinopril 10 mg oral tablet: 1 tab(s) orally once a day (08 Jul 2025 20:44)  Lumigan 0.01% ophthalmic solution: 1 drop(s) in each affected eye once a day (at bedtime) each eye (08 Jul 2025 20:46)  metFORMIN 500 mg oral tablet: 1 tab(s) orally once a day (08 Jul 2025 20:44)  NovoLOG 100 units/mL subcutaneous solution: 4 unit(s) subcutaneous 3 times a day (with meals) (08 Jul 2025 20:44)  pantoprazole 40 mg oral delayed release tablet: 1 tab(s) orally once a day (in the morning) (08 Jul 2025 20:44)  Rhopressa 0.02% ophthalmic solution: 1 drop(s) in each eye 3 times a day each eye (08 Jul 2025 20:44)  risperiDONE 0.5 mg oral tablet: 1 tab(s) orally once a day (08 Jul 2025 20:44)      MEDICATIONS  (STANDING):  atorvastatin 80 milliGRAM(s) Oral at bedtime  dextrose 5% + sodium chloride 0.9%. 1000 milliLiter(s) (50 mL/Hr) IV Continuous <Continuous>  dextrose 5%. 1000 milliLiter(s) (100 mL/Hr) IV Continuous <Continuous>  dextrose 5%. 1000 milliLiter(s) (50 mL/Hr) IV Continuous <Continuous>  dextrose 50% Injectable 25 Gram(s) IV Push once  dextrose 50% Injectable 12.5 Gram(s) IV Push once  dextrose 50% Injectable 25 Gram(s) IV Push once  dextrose Oral Gel 15 Gram(s) Oral once  finasteride 5 milliGRAM(s) Oral daily  glucagon  Injectable 1 milliGRAM(s) IntraMuscular once  latanoprost 0.005% Ophthalmic Solution 1 Drop(s) Both EYES at bedtime  lidocaine   4% Patch 1 Patch Transdermal daily  pantoprazole  Injectable 40 milliGRAM(s) IV Push daily  polyethylene glycol 3350 17 Gram(s) Oral daily  risperiDONE   Tablet 0.5 milliGRAM(s) Oral daily    MEDICATIONS  (PRN):  acetaminophen     Tablet .. 650 milliGRAM(s) Oral every 6 hours PRN Temp greater or equal to 38C (100.4F), Mild Pain (1 - 3)  acetaminophen   IVPB .. 1000 milliGRAM(s) IV Intermittent once PRN Temp greater or equal to 38.5C (101.3F), Moderate Pain (4 - 6)  aluminum hydroxide/magnesium hydroxide/simethicone Suspension 30 milliLiter(s) Oral every 4 hours PRN Dyspepsia  bisacodyl Suppository 10 milliGRAM(s) Rectal daily PRN Constipation  haloperidol    Injectable 0.5 milliGRAM(s) IntraMuscular every 6 hours PRN Agitation  melatonin 3 milliGRAM(s) Oral at bedtime PRN Insomnia  ondansetron Injectable 4 milliGRAM(s) IV Push every 8 hours PRN Nausea and/or Vomiting      Diet, Pureed:   Consistent Carbohydrate No Snacks (07-09-25 @ 05:32) [Active]          Vital Signs Last 24 Hrs  T(C): 36.9 (09 Jul 2025 06:09), Max: 37.1 (08 Jul 2025 21:36)  T(F): 98.4 (09 Jul 2025 06:09), Max: 98.7 (08 Jul 2025 21:36)  HR: 71 (09 Jul 2025 04:00) (68 - 94)  BP: 162/85 (09 Jul 2025 04:00) (128/84 - 215/93)  BP(mean): 106 (09 Jul 2025 04:00) (101 - 123)  RR: 13 (09 Jul 2025 04:00) (13 - 19)  SpO2: 99% (09 Jul 2025 04:00) (96% - 100%)    Parameters below as of 08 Jul 2025 21:00  Patient On (Oxygen Delivery Method): nasal cannula  O2 Flow (L/min): 2        07-08-25 @ 07:01  -  07-09-25 @ 07:00  --------------------------------------------------------  IN: 300 mL / OUT: 0 mL / NET: 300 mL              LABS:                        12.8   7.00  )-----------( 178      ( 09 Jul 2025 05:30 )             40.7     07-09    140  |  106  |  14  ----------------------------<  102[H]  4.2   |  25  |  0.95    Ca    8.6      09 Jul 2025 05:30    TPro  6.3  /  Alb  3.1[L]  /  TBili  0.3  /  DBili  0.1  /  AST  36  /  ALT  15  /  AlkPhos  73  07-09    PT/INR - ( 09 Jul 2025 05:30 )   PT: 11.4 sec;   INR: 0.98 ratio         PTT - ( 08 Jul 2025 18:16 )  PTT:30.0 sec  Urinalysis Basic - ( 09 Jul 2025 05:30 )    Color: x / Appearance: x / SG: x / pH: x  Gluc: 102 mg/dL / Ketone: x  / Bili: x / Urobili: x   Blood: x / Protein: x / Nitrite: x   Leuk Esterase: x / RBC: x / WBC x   Sq Epi: x / Non Sq Epi: x / Bacteria: x            WBC:  WBC Count: 7.00 K/uL (07-09 @ 05:30)  WBC Count: 3.40 K/uL (07-08 @ 18:16)      MICROBIOLOGY:  RECENT CULTURES:              PT/INR - ( 09 Jul 2025 05:30 )   PT: 11.4 sec;   INR: 0.98 ratio         PTT - ( 08 Jul 2025 18:16 )  PTT:30.0 sec    Sodium:  Sodium: 140 mmol/L (07-09 @ 05:30)  Sodium: 141 mmol/L (07-08 @ 18:16)      0.95 mg/dL 07-09 @ 05:30  1.12 mg/dL 07-08 @ 18:16      Hemoglobin:  Hemoglobin: 12.8 g/dL (07-09 @ 05:30)  Hemoglobin: 12.3 g/dL (07-08 @ 18:16)      Platelets: Platelet Count - Automated: 178 K/uL (07-09 @ 05:30)  Platelet Count - Automated: 238 K/uL (07-08 @ 18:16)      LIVER FUNCTIONS - ( 09 Jul 2025 05:30 )  Alb: 3.1 g/dL / Pro: 6.3 g/dL / ALK PHOS: 73 U/L / ALT: 15 U/L / AST: 36 U/L / GGT: x             Urinalysis Basic - ( 09 Jul 2025 05:30 )    Color: x / Appearance: x / SG: x / pH: x  Gluc: 102 mg/dL / Ketone: x  / Bili: x / Urobili: x   Blood: x / Protein: x / Nitrite: x   Leuk Esterase: x / RBC: x / WBC x   Sq Epi: x / Non Sq Epi: x / Bacteria: x        RADIOLOGY & ADDITIONAL STUDIES:      MICROBIOLOGY:  RECENT CULTURES:

## 2025-07-09 NOTE — PROGRESS NOTE ADULT - NSPROGADDITIONALINFOA_GEN_ALL_CORE
Anticipate d/c in 24 hrs after DM rx are adjusted by diabetic team and if patient remains stable .Management d/w Dr Johnson , Dr Wright and Dr James 07/09

## 2025-07-09 NOTE — SWALLOW BEDSIDE ASSESSMENT ADULT - SWALLOW EVAL: DIAGNOSIS
Pt's chart reviewed and order received. Per transfer paperwork, pt's baseline diet is "thin liquids, ground with soft cookies, cakes, and sandwiches allowed." The pt was received seated in the recliner. He was awake, alert, and in NAD. He was on RA. Pt was oriented to person only. Suspect overall reduced cognition. Unclear at this time if pt has dentures back at his residence. Pt unable to clarify. Pt edentulous at time of today's assessment. Today, the pt consumed puree, minced and moist, and thin liquids. Oral stage deemed overall functional for puree and thin liquids. Mild oral dysphagia noted for minced and moist marked by prolonged mastication, prolonged oral transit, talking with food remaining in the oral cavity though benefited from verbal cues to cease talking, and mild lingual residue post primary swallow. Pt with reduced awareness of said residue requiring cueing to facilitate subsequent swallow which cleared remaining residue.

## 2025-07-09 NOTE — DISCHARGE NOTE PROVIDER - PROVIDER TOKENS
PROVIDER:[TOKEN:[[id number]]] PROVIDER:[TOKEN:[[id number]],FOLLOWUP:[2 weeks]],PROVIDER:[TOKEN:[[id number]],FOLLOWUP:[1 week]],PROVIDER:[TOKEN:[[id number]],FOLLOWUP:[1 month]]

## 2025-07-09 NOTE — CONSULT NOTE ADULT - PROBLEM SELECTOR RECOMMENDATION 2
see GOC note written separately  DNR/DNI confirmed and new MOLST completed to reflect wishes    rest of care ongoing per goc. c/w medical management

## 2025-07-09 NOTE — DISCHARGE NOTE PROVIDER - NSDCCPCAREPLAN_GEN_ALL_CORE_FT
PRINCIPAL DISCHARGE DIAGNOSIS  Diagnosis: Acute metabolic encephalopathy  Assessment and Plan of Treatment: 2/2 to hypoglycemia      SECONDARY DISCHARGE DIAGNOSES  Diagnosis: Stroke-like symptoms  Assessment and Plan of Treatment: ruled out for acute cva    Diagnosis: Hypoglycemia  Assessment and Plan of Treatment:     Diagnosis: SDH (subdural hematoma)  Assessment and Plan of Treatment: chronic    Diagnosis: BPH (benign prostatic hyperplasia)  Assessment and Plan of Treatment:     Diagnosis: HTN (hypertension)  Assessment and Plan of Treatment:     Diagnosis: HLD (hyperlipidemia)  Assessment and Plan of Treatment:     Diagnosis: DM (diabetes mellitus), type 2  Assessment and Plan of Treatment:     Diagnosis: GERD (gastroesophageal reflux disease)  Assessment and Plan of Treatment:     Diagnosis: Anxiety  Assessment and Plan of Treatment:     Diagnosis: Dementia  Assessment and Plan of Treatment:

## 2025-07-09 NOTE — DIETITIAN INITIAL EVALUATION ADULT - ADD RECOMMEND
1) Recommend CCHO with evening snack for therapeutic diet to avoid hypoglycemia (with option to upgrade to regular if continues with hypoglycemia); defer textural modification to SLP  2) Continue to monitor PO intake, tolerance to diet prescription, weights, labs, GI tolerance, and skin integrity. MD notified via teams regarding any change/changes to diet order.   3) DM and SLP eval pending and will incorporate recommendations into diet  4) MVI and thiamine supplementation

## 2025-07-09 NOTE — DISCHARGE NOTE NURSING/CASE MANAGEMENT/SOCIAL WORK - FINANCIAL ASSISTANCE
[facility] provides services at a reduced cost to those who are determined to be eligible through [facility]’s financial assistance program. Information regarding [facility]’s financial assistance program can be found by going to [email or web address] or by calling [phone].

## 2025-07-09 NOTE — CONSULT NOTE ADULT - NS ATTEST RISK PROBLEM GEN_ALL_CORE FT
1. Number and complexity of problems addressed for this patient:    1.1 Moderate (At least 1)  [ ] 1 or more chronic illnesses with exacerbation, progression, or side effects of treatment  [ ] 2 or more stable chronic illnesses  [ ] 1 undiagnosed new problem with uncertain prognosis  [ ] 1 acute illness with systemic symptoms  [ ] 1 acute complicated injury  1.2 High (At least 1)   [ ] 1 or more chronic illnesses with severe exacerbation, progression, or side effects of treatment  [x ] 1 acute or chronic illnesses or injuries that may pose a threat to life or bodily function    2. Amount and/or Complexity of Data that was Reviewed and Analyzed for this case:       Moderate (1 out of 3)       High (2 out of 3)  2.1. (Any combination of 3 of the following)   [ x] Prior External notes were reviewed  [x ] Each test result was reviewed (see "LABS" and "RADIOLOGY & ADDITIONAL STUDIES" above)  [ x] The following tests were ordered and/or reviewed (Only count 1 point for ordering or reviewing a unique test):  	[x ]CBC  	[ x] Chemistry   	[x ] Imaging   	[ ] Other:   [ ] Assessment requiring an independent historian   		Name of historian and relationship:   2.2  [x ] Personally review and interpretation of  image or testing   2.3  [ ] Discussion of management or test interpretation with external physician/other qualified health care professional\appropriate source (not separately reported)    3. Risk of Complications and/or Morbidity or Mortality of for this Patient’s Management:  3.1 Moderate risk of morbidity from additional diagnostic testing or treatment (At least 1):   [ ] Prescription drug management   [ ] Decision regarding minor surgery, treatment, or procedure with identified patient or procedure risk factors  [ ] Decision regarding elective major surgery, treatment, or procedure without identified patient or procedure risk factors   [ ] Diagnosis or treatment significantly limited by social determinants of health   [ ] Other:   3.2 High risk of morbidity from additional diagnostic testing or treatment (At least 1):   [ ] Drug therapy requiring intensive monitoring for toxicity   [ ] Decision regarding elective major surgery, treatment, or procedure with identified patient or procedure risk factors   [ ] Decision regarding emergency major surgery, treatment, or procedure   [ ] Decision regarding hospitalization or escalation of hospital-level of care  [x ] Decision not to resuscitate, not to intubate, or to de-escalate care because of poor prognosis   [ ] Decision to proceed or not with artificial nutrition   [ ] Parenteral controlled substance  [ ] Other:

## 2025-07-09 NOTE — CARE COORDINATION ASSESSMENT. - NSCAREPROVIDERS_GEN_ALL_CORE_FT
CARE PROVIDERS:  Accepting Physician: Heidi Boyd  Administration: Aicha Lincoln  Administration: Jose Maria Wilks  Admitting: Heidi Boyd  Attending: Heidi Boyd  Cardiology Technician: Cecy Griffith  Case Management: Shauna Youngblood  Consultant: Herber Patel  Consultant: Dakota Cho  Consultant: Aide Sarah  Consultant: Sadiq Wright  Consultant: Desmond Don  Consultant: Rodrigo James  Covering Team: Duong Nogueira  ED Attending: Dakota Erwin  ED Attending2: Yohannes Dupont  ED Nurse: Bonnie Evans  Emergency Medicine: Yohannes Dupont  Nurse: Candelaria Orta  Nurse: Cecy Pablo  Nurse: Lacie Brown  Nurse: Adela Santos  Nurse: Didi gN  Nurse: Kemi Austin  Ordered: Physician, Ordering  Ordered: ServiceAccount, [facility]  Outpatient Provider: Desmond Don  Override: Cecy Pablo  Override: Candelaria Orta  Override: Lacie Brown  PCA/Nursing Assistant: Emmanuelle Morris  Quality Review: Beverley Bray  Registered Dietitian: Chey Cagle  Respiratory Therapy: Hernando Adams  : Porsha Tavarez  : Andriy Martinez  Team: [facility], Team  Team: Team, Critical Care Non Applicable  UR// Supp. Assoc.: Cydney Ga

## 2025-07-09 NOTE — CARE COORDINATION ASSESSMENT. - ASSESSMENT CONCERNS TO BE ADDRESSED
Pt is a74 year old male who was at [facility] for respite care while his daughters are on vacation.  Pt admitted with AMS and was found unresponsive.  Pt has dementia.  SW spoke to patient's daughter Aide who states she is patients cdpap along with her sister.  Pt was supposed to be at SNF until 7/12 while his daughters are on vacation.   Pt was ambulating with walker and cane pta.  When home he receives HCPT with [facility] 2x week.  Plan will be to return to SNF for respite vs home with daughter's (daughter will be back Saturday7/13)./care coordination

## 2025-07-09 NOTE — DISCHARGE NOTE NURSING/CASE MANAGEMENT/SOCIAL WORK - PATIENT PORTAL LINK FT
You can access the FollowMyHealth Patient Portal offered by [facility] by registering at the following website: [email or web address]. By joining [facility]’s FollowMyHealth portal, you will also be able to view your health information using other applications (apps) compatible with our system.

## 2025-07-09 NOTE — DISCHARGE NOTE PROVIDER - NSDCCAREPROVSEEN_GEN_ALL_CORE_FT
Arian, Desmond Wright, Sadiq Hunter, Richard Cho, Dakota Alvarez, Valeria Perlman, Corey Boyd, Heidi James, Rodrigo Bird, Maggie NOBLE

## 2025-07-09 NOTE — DISCHARGE NOTE NURSING/CASE MANAGEMENT/SOCIAL WORK - NSSCTYPOFSERV_GEN_ALL_CORE
[facility] [address]/ Home Care Services with [facility] ( / [phone] )  Home Care RN will call within 24/48 hrs of DC from the hospital to set up Initial Assessment.

## 2025-07-10 LAB
ANION GAP SERPL CALC-SCNC: 9 MMOL/L — SIGNIFICANT CHANGE UP (ref 5–17)
BUN SERPL-MCNC: 12 MG/DL — SIGNIFICANT CHANGE UP (ref 7–23)
CALCIUM SERPL-MCNC: 9 MG/DL — SIGNIFICANT CHANGE UP (ref 8.4–10.5)
CHLORIDE SERPL-SCNC: 105 MMOL/L — SIGNIFICANT CHANGE UP (ref 96–108)
CO2 SERPL-SCNC: 25 MMOL/L — SIGNIFICANT CHANGE UP (ref 22–31)
CREAT SERPL-MCNC: 0.8 MG/DL — SIGNIFICANT CHANGE UP (ref 0.5–1.3)
EGFR: 92 ML/MIN/1.73M2 — SIGNIFICANT CHANGE UP
EGFR: 92 ML/MIN/1.73M2 — SIGNIFICANT CHANGE UP
GLUCOSE BLDC GLUCOMTR-MCNC: 140 MG/DL — HIGH (ref 70–99)
GLUCOSE BLDC GLUCOMTR-MCNC: 182 MG/DL — HIGH (ref 70–99)
GLUCOSE BLDC GLUCOMTR-MCNC: 210 MG/DL — HIGH (ref 70–99)
GLUCOSE BLDC GLUCOMTR-MCNC: 259 MG/DL — HIGH (ref 70–99)
GLUCOSE SERPL-MCNC: 189 MG/DL — HIGH (ref 70–99)
HCT VFR BLD CALC: 41.9 % — SIGNIFICANT CHANGE UP (ref 39–50)
HGB BLD-MCNC: 13.9 G/DL — SIGNIFICANT CHANGE UP (ref 13–17)
MCHC RBC-ENTMCNC: 31 PG — SIGNIFICANT CHANGE UP (ref 27–34)
MCHC RBC-ENTMCNC: 33.2 G/DL — SIGNIFICANT CHANGE UP (ref 32–36)
MCV RBC AUTO: 93.3 FL — SIGNIFICANT CHANGE UP (ref 80–100)
NRBC # BLD AUTO: 0 K/UL — SIGNIFICANT CHANGE UP (ref 0–0)
NRBC # FLD: 0 K/UL — SIGNIFICANT CHANGE UP (ref 0–0)
NRBC BLD AUTO-RTO: 0 /100 WBCS — SIGNIFICANT CHANGE UP (ref 0–0)
PLATELET # BLD AUTO: 219 K/UL — SIGNIFICANT CHANGE UP (ref 150–400)
PMV BLD: 8.8 FL — SIGNIFICANT CHANGE UP (ref 7–13)
POTASSIUM SERPL-MCNC: 5 MMOL/L — SIGNIFICANT CHANGE UP (ref 3.5–5.3)
POTASSIUM SERPL-SCNC: 5 MMOL/L — SIGNIFICANT CHANGE UP (ref 3.5–5.3)
RBC # BLD: 4.49 M/UL — SIGNIFICANT CHANGE UP (ref 4.2–5.8)
RBC # FLD: 13.1 % — SIGNIFICANT CHANGE UP (ref 10.3–14.5)
SODIUM SERPL-SCNC: 139 MMOL/L — SIGNIFICANT CHANGE UP (ref 135–145)
WBC # BLD: 3.97 K/UL — SIGNIFICANT CHANGE UP (ref 3.8–10.5)
WBC # FLD AUTO: 3.97 K/UL — SIGNIFICANT CHANGE UP (ref 3.8–10.5)

## 2025-07-10 PROCEDURE — 99221 1ST HOSP IP/OBS SF/LOW 40: CPT

## 2025-07-10 RX ORDER — LISINOPRIL 5 MG/1
40 TABLET ORAL DAILY
Refills: 0 | Status: DISCONTINUED | OUTPATIENT
Start: 2025-07-11 | End: 2025-07-12

## 2025-07-10 RX ORDER — INSULIN LISPRO 100 U/ML
INJECTION, SOLUTION INTRAVENOUS; SUBCUTANEOUS
Refills: 0 | Status: DISCONTINUED | OUTPATIENT
Start: 2025-07-10 | End: 2025-07-11

## 2025-07-10 RX ORDER — POLYETHYLENE GLYCOL 3350 17 G/17G
17 POWDER, FOR SOLUTION ORAL
Qty: 0 | Refills: 0 | DISCHARGE
Start: 2025-07-10

## 2025-07-10 RX ORDER — ATORVASTATIN CALCIUM 80 MG/1
1 TABLET, FILM COATED ORAL
Qty: 0 | Refills: 0 | DISCHARGE
Start: 2025-07-10

## 2025-07-10 RX ORDER — B1/B2/B3/B5/B6/B12/VIT C/FOLIC 500-0.5 MG
1 TABLET ORAL
Qty: 0 | Refills: 0 | DISCHARGE
Start: 2025-07-10

## 2025-07-10 RX ORDER — AMLODIPINE BESYLATE 10 MG/1
5 TABLET ORAL DAILY
Refills: 0 | Status: DISCONTINUED | OUTPATIENT
Start: 2025-07-10 | End: 2025-07-12

## 2025-07-10 RX ORDER — INSULIN GLARGINE-YFGN 100 [IU]/ML
10 INJECTION, SOLUTION SUBCUTANEOUS AT BEDTIME
Refills: 0 | Status: DISCONTINUED | OUTPATIENT
Start: 2025-07-10 | End: 2025-07-11

## 2025-07-10 RX ORDER — INSULIN LISPRO 100 U/ML
INJECTION, SOLUTION INTRAVENOUS; SUBCUTANEOUS AT BEDTIME
Refills: 0 | Status: DISCONTINUED | OUTPATIENT
Start: 2025-07-10 | End: 2025-07-12

## 2025-07-10 RX ORDER — LISINOPRIL 5 MG/1
30 TABLET ORAL ONCE
Refills: 0 | Status: COMPLETED | OUTPATIENT
Start: 2025-07-10 | End: 2025-07-10

## 2025-07-10 RX ADMIN — Medication 40 MILLIGRAM(S): at 12:39

## 2025-07-10 RX ADMIN — FINASTERIDE 5 MILLIGRAM(S): 1 TABLET, FILM COATED ORAL at 12:38

## 2025-07-10 RX ADMIN — POLYETHYLENE GLYCOL 3350 17 GRAM(S): 17 POWDER, FOR SOLUTION ORAL at 12:40

## 2025-07-10 RX ADMIN — Medication 1 TABLET(S): at 12:38

## 2025-07-10 RX ADMIN — AMLODIPINE BESYLATE 5 MILLIGRAM(S): 10 TABLET ORAL at 12:38

## 2025-07-10 RX ADMIN — LATANOPROST PF 1 DROP(S): 0.05 SOLUTION/ DROPS OPHTHALMIC at 22:15

## 2025-07-10 RX ADMIN — LISINOPRIL 10 MILLIGRAM(S): 5 TABLET ORAL at 05:50

## 2025-07-10 RX ADMIN — INSULIN LISPRO 3: 100 INJECTION, SOLUTION INTRAVENOUS; SUBCUTANEOUS at 12:39

## 2025-07-10 RX ADMIN — ATORVASTATIN CALCIUM 80 MILLIGRAM(S): 80 TABLET, FILM COATED ORAL at 22:14

## 2025-07-10 RX ADMIN — INSULIN LISPRO 2: 100 INJECTION, SOLUTION INTRAVENOUS; SUBCUTANEOUS at 17:13

## 2025-07-10 RX ADMIN — Medication 0.5 MILLIGRAM(S): at 12:38

## 2025-07-10 RX ADMIN — Medication 100 MILLIGRAM(S): at 12:38

## 2025-07-10 RX ADMIN — Medication 10 MILLIGRAM(S): at 04:25

## 2025-07-10 RX ADMIN — INSULIN GLARGINE-YFGN 10 UNIT(S): 100 INJECTION, SOLUTION SUBCUTANEOUS at 22:15

## 2025-07-10 NOTE — PROGRESS NOTE ADULT - SUBJECTIVE AND OBJECTIVE BOX
CHIEF COMPLAINT/ REASON FOR VISIT  .. Patient was seen to address the  issue listed under PROBLEM LIST which is located toward bottom of this note     [facility]    Allergies    Allergy Status Unknown    Intolerances        PAST MEDICAL & SURGICAL HISTORY:  DM (diabetes mellitus), type 2      HTN (hypertension)      HLD (hyperlipidemia)      Dementia      GERD (gastroesophageal reflux disease)      BPH (benign prostatic hyperplasia)      Anxiety      Glaucoma      DM (diabetes mellitus), type 2          FAMILY HISTORY:      Home Medications:  acetaminophen 500 mg oral tablet: 1 tab(s) orally once a day (08 Jul 2025 20:44)  alfuzosin 10 mg oral tablet, extended release: 1 tab(s) orally once a day (at bedtime) (08 Jul 2025 20:44)  Aspercreme Arthritis Pain 1% topical gel: Apply topically to affected area once a day apply to lower back (08 Jul 2025 20:44)  Basaglar KwikPen 100 units/mL subcutaneous solution: 12 unit(s) subcutaneous once a day (08 Jul 2025 20:44)  Combigan 0.2%-0.5% ophthalmic solution: 1 drop(s) in each affected eye 2 times a day each eye (08 Jul 2025 20:44)  finasteride 5 mg oral tablet: 1 tab(s) orally once a day (08 Jul 2025 20:44)  lisinopril 10 mg oral tablet: 1 tab(s) orally once a day (08 Jul 2025 20:44)  Lumigan 0.01% ophthalmic solution: 1 drop(s) in each affected eye once a day (at bedtime) each eye (08 Jul 2025 20:46)  metFORMIN 500 mg oral tablet: 1 tab(s) orally once a day (08 Jul 2025 20:44)  NovoLOG 100 units/mL subcutaneous solution: 4 unit(s) subcutaneous 3 times a day (with meals) (08 Jul 2025 20:44)  pantoprazole 40 mg oral delayed release tablet: 1 tab(s) orally once a day (in the morning) (08 Jul 2025 20:44)  Rhopressa 0.02% ophthalmic solution: 1 drop(s) in each eye 3 times a day each eye (08 Jul 2025 20:44)  risperiDONE 0.5 mg oral tablet: 1 tab(s) orally once a day (08 Jul 2025 20:44)      MEDICATIONS  (STANDING):  atorvastatin 80 milliGRAM(s) Oral at bedtime  dextrose 5%. 1000 milliLiter(s) (100 mL/Hr) IV Continuous <Continuous>  dextrose 5%. 1000 milliLiter(s) (50 mL/Hr) IV Continuous <Continuous>  dextrose 50% Injectable 25 Gram(s) IV Push once  dextrose 50% Injectable 12.5 Gram(s) IV Push once  dextrose 50% Injectable 25 Gram(s) IV Push once  dextrose Oral Gel 15 Gram(s) Oral once  finasteride 5 milliGRAM(s) Oral daily  glucagon  Injectable 1 milliGRAM(s) IntraMuscular once  latanoprost 0.005% Ophthalmic Solution 1 Drop(s) Both EYES at bedtime  lidocaine   4% Patch 1 Patch Transdermal daily  lisinopril 10 milliGRAM(s) Oral daily  multivitamin 1 Tablet(s) Oral daily  pantoprazole  Injectable 40 milliGRAM(s) IV Push daily  polyethylene glycol 3350 17 Gram(s) Oral daily  risperiDONE   Tablet 0.5 milliGRAM(s) Oral daily  thiamine 100 milliGRAM(s) Oral daily    MEDICATIONS  (PRN):  acetaminophen     Tablet .. 650 milliGRAM(s) Oral every 6 hours PRN Temp greater or equal to 38C (100.4F), Mild Pain (1 - 3)  acetaminophen   IVPB .. 1000 milliGRAM(s) IV Intermittent once PRN Temp greater or equal to 38.5C (101.3F), Moderate Pain (4 - 6)  aluminum hydroxide/magnesium hydroxide/simethicone Suspension 30 milliLiter(s) Oral every 4 hours PRN Dyspepsia  bisacodyl Suppository 10 milliGRAM(s) Rectal daily PRN Constipation  haloperidol    Injectable 0.5 milliGRAM(s) IntraMuscular every 6 hours PRN Agitation  hydrALAZINE 10 milliGRAM(s) Oral every 8 hours PRN Systolic blood pressure >170  melatonin 3 milliGRAM(s) Oral at bedtime PRN Insomnia  ondansetron Injectable 4 milliGRAM(s) IV Push every 8 hours PRN Nausea and/or Vomiting      Diet, Pureed:   Consistent Carbohydrate Evening Snack (07-09-25 @ 11:12) [Active]          Vital Signs Last 24 Hrs  T(C): 36.4 (10 Jul 2025 05:58), Max: 37.6 (09 Jul 2025 23:51)  T(F): 97.6 (10 Jul 2025 05:58), Max: 99.7 (09 Jul 2025 23:51)  HR: 69 (10 Jul 2025 04:48) (60 - 87)  BP: 147/74 (10 Jul 2025 04:48) (140/57 - 227/82)  BP(mean): 96 (10 Jul 2025 04:48) (81 - 123)  RR: 12 (10 Jul 2025 04:48) (10 - 18)  SpO2: 100% (10 Jul 2025 04:48) (98% - 100%)    Parameters below as of 09 Jul 2025 16:00  Patient On (Oxygen Delivery Method): room air          07-09-25 @ 07:01  -  07-10-25 @ 07:00  --------------------------------------------------------  IN: 100 mL / OUT: 0 mL / NET: 100 mL              LABS:                        13.9   3.97  )-----------( 219      ( 10 Jul 2025 06:00 )             41.9     07-10    139  |  105  |  12  ----------------------------<  189[H]  5.0   |  25  |  0.80    Ca    9.0      10 Jul 2025 06:00    TPro  6.3  /  Alb  3.1[L]  /  TBili  0.3  /  DBili  0.1  /  AST  36  /  ALT  15  /  AlkPhos  73  07-09    PT/INR - ( 09 Jul 2025 05:30 )   PT: 11.4 sec;   INR: 0.98 ratio         PTT - ( 08 Jul 2025 18:16 )  PTT:30.0 sec  Urinalysis Basic - ( 10 Jul 2025 06:00 )    Color: x / Appearance: x / SG: x / pH: x  Gluc: 189 mg/dL / Ketone: x  / Bili: x / Urobili: x   Blood: x / Protein: x / Nitrite: x   Leuk Esterase: x / RBC: x / WBC x   Sq Epi: x / Non Sq Epi: x / Bacteria: x            WBC:  WBC Count: 3.97 K/uL (07-10 @ 06:00)  WBC Count: 7.00 K/uL (07-09 @ 05:30)  WBC Count: 3.40 K/uL (07-08 @ 18:16)      MICROBIOLOGY:  RECENT CULTURES:              PT/INR - ( 09 Jul 2025 05:30 )   PT: 11.4 sec;   INR: 0.98 ratio         PTT - ( 08 Jul 2025 18:16 )  PTT:30.0 sec    Sodium:  Sodium: 139 mmol/L (07-10 @ 06:00)  Sodium: 140 mmol/L (07-09 @ 05:30)  Sodium: 141 mmol/L (07-08 @ 18:16)      0.80 mg/dL 07-10 @ 06:00  0.95 mg/dL 07-09 @ 05:30  1.12 mg/dL 07-08 @ 18:16      Hemoglobin:  Hemoglobin: 13.9 g/dL (07-10 @ 06:00)  Hemoglobin: 12.8 g/dL (07-09 @ 05:30)  Hemoglobin: 12.3 g/dL (07-08 @ 18:16)      Platelets: Platelet Count - Automated: 219 K/uL (07-10 @ 06:00)  Platelet Count - Automated: 178 K/uL (07-09 @ 05:30)  Platelet Count - Automated: 238 K/uL (07-08 @ 18:16)      LIVER FUNCTIONS - ( 09 Jul 2025 05:30 )  Alb: 3.1 g/dL / Pro: 6.3 g/dL / ALK PHOS: 73 U/L / ALT: 15 U/L / AST: 36 U/L / GGT: x             Urinalysis Basic - ( 10 Jul 2025 06:00 )    Color: x / Appearance: x / SG: x / pH: x  Gluc: 189 mg/dL / Ketone: x  / Bili: x / Urobili: x   Blood: x / Protein: x / Nitrite: x   Leuk Esterase: x / RBC: x / WBC x   Sq Epi: x / Non Sq Epi: x / Bacteria: x        RADIOLOGY & ADDITIONAL STUDIES:      MICROBIOLOGY:  RECENT CULTURES:

## 2025-07-10 NOTE — CONSULT NOTE ADULT - ASSESSMENT
The patient is a 74 year old male with a history of HTN, HL, DM, BPH, dementia who presents with AMS.    Plan:  - ECG with sinus rhythm and no evidence of ischemia/infarction  - Check echo  - Monitor on telemetry  - Cardiac enzymes negative  - CTA head and neck with hygroma vs. 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 no SDH), start aspirin 81 mg daily  - Neurology eval
   Initial evaluation/Pulmonary Critical Care Consultation requested by DR SOLIZ  on 7/8/2025  from Dr Rodrigo James    Patient examined chart reviewed    HOSPITAL ADMISSION   PATIENT CAME  FROM (if information available)      REASON FOR VISIT  .. Management of problems listed below      CC.   . 7/8/2025 PT BIB EMS from [facility] c/o AMS; pt found unresponsive at facility at 1700 with glucose of 49; pt got glucagon x2 from facility; hx dementia; pt responsive upon arrival to ED ; answers to name     OVERALL PRESENTATION.  . 7/8/2025 74 M brought in by EMS from [facility] for altered mental status.  Per EMS report patient's last known well was just prior to 5 PM when patient was next seen he was unresponsive.  EMS relates staff at [facility] found the patient to be hypoglycemic with a blood sugar of 49 they gave 2 doses of glucagon and oral glucose paste and blood glucose improved to 71 upon EMS arrival.  EMS reports they were told patient has dementia his baseline is awake and confused which is how the patient presented with EMS.  Patient poor historian secondary to dementia unsure why in ER denies any complaints.  No further history available.  PMD Herminio Bird  . [facility].        VITALS/GAS EXCHANGE/DRIPS  ABGS.     .  VS/ PO/IO/ VENT/ DRIPS.   7/8/2025 afeb 78 190/88   7/8/2025 2l 100%     [id number]   SUMMARY BASELINE .     . 74 m baseline confused sent from [facility]   CC.   . 7/8/2025 HYPOGLYCEMIA 49   . 7/8/2025 UNRESPONSIVE BRIEFLY WHILE AT [facility] AROUND 5P   MAIN ISSUES.  . INFECTION DATA W 7/8/2025 w 3.4 cxr 7/8 napd   . CAD Tr 7/8/2025 Tr 25 7/8 ATORVASTAT 80   . HEMAT 7/8/2025 Hb 12.3 Plt 238     . RENAL DATA 7/8/2025 Na 141 K 3.6 CO2 28 Cr 1.1   . HYPOGLYCEMIA A/R 7/8/2025 Monitor   . CHRONIC R SUBDURAL HEMATOMA 7/8/2025 CTA HEAD   . VERTEBRAL R INTRADURAL ARTRY OCCLUSION 7/8 CTA   . ALTERED MENTAL STATE 7/8/2025 ct head 7/8/2025 chronic infarct left occipital lobe Small right holohemispheric subdural upto 0.9 cm in width hygroma vs old subdural hematoma Occlusion of quin distal most segment of right intradural vet=rtebtatl artery   INFECTION MGMT.  . MICROBIO.   . ANTIBIO.  . SCV2.   GOC.  ....   BEST PRACTICE ISSUES.   . DIET. 7/8/2025 NPO except meds   . DYSPHAGIA EVAL.   . FREE WATER.   . IV FLUID.  ..... 7/8/2025 D5 NS 50   . DVT PPLX. 7/8/2025 No pharmac pplx pending neuro eval (sybdural bleed)   . HOB ELEVATN.  Yes  . ASSESS NEED FOR HOME O2 AT DISCHARGE: Will need home O2 if ra rest/exertion po belo 88%   . INFECTION PPLX .   . STRESS ULCER PPLX. 7/8/2025 PROTONIX 40   ....   ICU STAY. None   PROCEDURES/DEVICES .  PMH.   .... Dementia     PATIENT DATA   . ALLERGY.nka  . WEIGHT. 7/8/2025 53  . BMI. 7/8/2025 18     DISCUSSIONS.  .... Discussed with primary care and relevant consultants on an ongoing basis       TIME SPENT.  . Over 55  minutes aggregate care time spent on encounter; activities included   direct patient care, counseling and/or coordinating care reviewing notes, lab data/ imaging , discussion with multidisciplinary team/ patient  /family and explaining in detail risks, benefits, alternatives  of the recommendations     ROSEMARIE DANIEL  74 m 7/8/2025   
75 yo AAM who was brought in by EMS from [facility] for altered mental status. Admitted for r/o CVA. Palliative consulted for AD/GOC
Physical Exam:   Vital Signs Last 24 Hrs  T(C): 36.8 (10 Jul 2025 07:43), Max: 37.6 (09 Jul 2025 23:51)  T(F): 98.3 (10 Jul 2025 07:43), Max: 99.7 (09 Jul 2025 23:51)  HR: 69 (10 Jul 2025 04:48) (60 - 87)  BP: 147/74 (10 Jul 2025 04:48) (140/57 - 227/82)  BP(mean): 96 (10 Jul 2025 04:48) (81 - 123)  RR: 12 (10 Jul 2025 04:48) (10 - 18)  SpO2: 100% (10 Jul 2025 04:48) (98% - 100%)    Parameters below as of 09 Jul 2025 16:00  Patient On (Oxygen Delivery Method): room air    CAPILLARY BLOOD GLUCOSE      POCT Blood Glucose.: 182 mg/dL (10 Jul 2025 07:53)  POCT Blood Glucose.: 201 mg/dL (09 Jul 2025 21:26)  POCT Blood Glucose.: 248 mg/dL (09 Jul 2025 16:53)  POCT Blood Glucose.: 162 mg/dL (09 Jul 2025 12:01)        DIET: CC  >50%

## 2025-07-10 NOTE — PROGRESS NOTE ADULT - SUBJECTIVE AND OBJECTIVE BOX
Chief Complaint: AMS    Interval Events: No events overnight. Hypertensive this am.    Review of Systems:  General: No fevers, chills, weight gain  Skin: No rashes, color changes  Cardiovascular: No chest pain, orthopnea  Respiratory: No shortness of breath, cough  Gastrointestinal: No nausea, abdominal pain  Genitourinary: No incontinence, pain with urination  Musculoskeletal: No pain, swelling, decreased range of motion  Neurological: No headache, weakness  Psychiatric: No depression, anxiety  Endocrine: No weight gain, increased thirst  All other systems are comprehensively negative.    Physical Exam:  Vitals:        Vital Signs Last 24 Hrs  T(C): 36.8 (10 Jul 2025 07:43), Max: 37.6 (09 Jul 2025 23:51)  T(F): 98.3 (10 Jul 2025 07:43), Max: 99.7 (09 Jul 2025 23:51)  HR: 69 (10 Jul 2025 04:48) (60 - 87)  BP: 147/74 (10 Jul 2025 04:48) (140/57 - 227/82)  BP(mean): 96 (10 Jul 2025 04:48) (81 - 123)  RR: 12 (10 Jul 2025 04:48) (10 - 18)  SpO2: 100% (10 Jul 2025 04:48) (98% - 100%)  Parameters below as of 09 Jul 2025 16:00  Patient On (Oxygen Delivery Method): room air  General: NAD  HEENT: MMM  Neck: No JVD, no carotid bruit  Lungs: CTAB  CV: RRR, nl S1/S2, no M/R/G  Abdomen: S/NT/ND, +BS  Extremities: No LE edema, no cyanosis  Neuro: AAOx3, non-focal  Skin: No rash    Labs:                        13.9   3.97  )-----------( 219      ( 10 Jul 2025 06:00 )             41.9     07-10    139  |  105  |  12  ----------------------------<  189[H]  5.0   |  25  |  0.80    Ca    9.0      10 Jul 2025 06:00    TPro  6.3  /  Alb  3.1[L]  /  TBili  0.3  /  DBili  0.1  /  AST  36  /  ALT  15  /  AlkPhos  73  07-09        PT/INR - ( 09 Jul 2025 05:30 )   PT: 11.4 sec;   INR: 0.98 ratio         PTT - ( 08 Jul 2025 18:16 )  PTT:30.0 sec    ECG/Telemetry:

## 2025-07-10 NOTE — PROGRESS NOTE ADULT - SUBJECTIVE AND OBJECTIVE BOX
PROGRESS NOTE  Patient is a 74y old  Male who presents with a chief complaint of Altered mental status    Per HPI: Patient is 75 yo AAM who was brought in by EMS from [facility] for altered mental status.  Per EMS report patient's last known well was just prior to 5 PM when patient was next seen he was unresponsive.  EMS relates staff at [facility] found the patient to be hypoglycemic with a blood sugar of 49 they gave 2 doses of glucagon and oral glucose paste and blood glucose improved to 71 upon EMS arrival.  EMS reports they were told patient has dementia his baseline is awake and confused which is how the patient presented with EMS.  Patient poor historian secondary to dementia unsure why in ER denies any complaints.  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  - Not a candidate for mechanical thrombectomy w/ distal right vertebral artery occlusion- non amendable to thrombectomy.  - Follow up HgbA1c/ lipid profile - start HD atrovastatin daily   - Bedside dysphagia screen /PT / OT evaluation  - MRI brain w/o contrast  - TTE; r/o cardioembolic event /PFO  - Maintain permissive HTN for 24 hours  (08 Jul 2025 20:54) (09 Jul 2025 10:01)    Chart and available morning labs /imaging are reviewed electronically , urgent issues addressed . More information  is being added upon completion of rounds , when more information is collected and management discussed with consultants , medical staff and social service/case management on the floor   OVERNIGHT  No new issues reported by medical staff . All above noted Patient is resting in a bed comfortably No complains BG thia am 181 No distress noted Cleared by neurologist , case d/w Dr Johnson - no need in BASA or high dose statin from neurological standpoint , cardiac rx as per cardiologist  Diabetic team followup requested regarding d/c insulin regimen   HPI:  Patient is 75 yo AAM who was brought in by EMS from [facility] for altered mental status.  Per EMS report patient's last known well was just prior to 5 PM when patient was next seen he was unresponsive.  EMS relates staff at [facility] found the patient to be hypoglycemic with a blood sugar of 49 they gave 2 doses of glucagon and oral glucose paste and blood glucose improved to 71 upon EMS arrival.  EMS reports they were told patient has dementia his baseline is awake and confused which is how the patient presented with EMS.  Patient poor historian secondary to dementia unsure why in ER denies any complaints.  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  - Not a candidate for mechanical thrombectomy w/ distal right vertebral artery occlusion- non amendable to thrombectomy.  - Follow up HgbA1c/ lipid profile - start HD atrovastatin daily   - Bedside dysphagia screen /PT / OT evaluation  - MRI brain w/o contrast  - TTE; r/o cardioembolic event /PFO  - Maintain permissive HTN for 24 hours  (08 Jul 2025 20:54)    PAST MEDICAL & SURGICAL HISTORY:  DM (diabetes mellitus), type 2      Glaucoma      DM (diabetes mellitus), type 2      HTN (hypertension)      HLD (hyperlipidemia)      Dementia      BPH (benign prostatic hyperplasia)      GERD (gastroesophageal reflux disease)      Anxiety          MEDICATIONS  (STANDING):  atorvastatin 80 milliGRAM(s) Oral at bedtime  dextrose 5%. 1000 milliLiter(s) (100 mL/Hr) IV Continuous <Continuous>  dextrose 5%. 1000 milliLiter(s) (50 mL/Hr) IV Continuous <Continuous>  dextrose 50% Injectable 25 Gram(s) IV Push once  dextrose 50% Injectable 12.5 Gram(s) IV Push once  dextrose 50% Injectable 25 Gram(s) IV Push once  dextrose Oral Gel 15 Gram(s) Oral once  finasteride 5 milliGRAM(s) Oral daily  glucagon  Injectable 1 milliGRAM(s) IntraMuscular once  latanoprost 0.005% Ophthalmic Solution 1 Drop(s) Both EYES at bedtime  lidocaine   4% Patch 1 Patch Transdermal daily  lisinopril 10 milliGRAM(s) Oral daily  multivitamin 1 Tablet(s) Oral daily  pantoprazole  Injectable 40 milliGRAM(s) IV Push daily  polyethylene glycol 3350 17 Gram(s) Oral daily  risperiDONE   Tablet 0.5 milliGRAM(s) Oral daily  thiamine 100 milliGRAM(s) Oral daily    MEDICATIONS  (PRN):  acetaminophen     Tablet .. 650 milliGRAM(s) Oral every 6 hours PRN Temp greater or equal to 38C (100.4F), Mild Pain (1 - 3)  acetaminophen   IVPB .. 1000 milliGRAM(s) IV Intermittent once PRN Temp greater or equal to 38.5C (101.3F), Moderate Pain (4 - 6)  aluminum hydroxide/magnesium hydroxide/simethicone Suspension 30 milliLiter(s) Oral every 4 hours PRN Dyspepsia  bisacodyl Suppository 10 milliGRAM(s) Rectal daily PRN Constipation  haloperidol    Injectable 0.5 milliGRAM(s) IntraMuscular every 6 hours PRN Agitation  hydrALAZINE 10 milliGRAM(s) Oral every 8 hours PRN Systolic blood pressure >170  melatonin 3 milliGRAM(s) Oral at bedtime PRN Insomnia  ondansetron Injectable 4 milliGRAM(s) IV Push every 8 hours PRN Nausea and/or Vomiting      OBJECTIVE    T(C): 36.8 (07-10-25 @ 07:43), Max: 37.6 (07-09-25 @ 23:51)  HR: 69 (07-10-25 @ 04:48) (60 - 87)  BP: 147/74 (07-10-25 @ 04:48) (140/57 - 227/82)  RR: 12 (07-10-25 @ 04:48) (10 - 18)  SpO2: 100% (07-10-25 @ 04:48) (98% - 100%)  Wt(kg): --  I&O's Summary    09 Jul 2025 07:01  -  10 Jul 2025 07:00  --------------------------------------------------------  IN: 100 mL / OUT: 0 mL / NET: 100 mL          REVIEW OF SYSTEMS:  CONSTITUTIONAL: No fever, weight loss, or fatigue  EYES: No eye pain, visual disturbances, or discharge  ENMT:   No sinus or throat pain  NECK: No pain or stiffness  RESPIRATORY: No cough, wheezing, chills or hemoptysis; No shortness of breath  CARDIOVASCULAR: No chest pain, palpitations, dizziness, or leg swelling  GASTROINTESTINAL: No abdominal pain. No nausea, vomiting; No diarrhea or constipation. No melena or hematochezia.  GENITOURINARY: No dysuria, frequency, hematuria, or incontinence  NEUROLOGICAL: No headaches, memory loss, loss of strength, numbness, or tremors  SKIN: No itching, burning, rashes, or lesions   MUSCULOSKELETAL: No joint pain or swelling; No muscle, back, or extremity pain    PHYSICAL EXAM:  Appearance: NAD. VS past 24 hrs -as above   HEENT:   Moist oral mucosa. Conjunctiva clear b/l.   Neck : supple  Respiratory: Lungs CTAB.  Gastrointestinal:  Soft, nontender. No rebound. No rigidity. BS present	  Cardiovascular: RRR ,S1S2 present  Neurologic: Non-focal. Moving all extremities.  Extremities: No edema. No erythema. No calf tenderness.  Skin: No rashes, No ecchymoses, No cyanosis.	  wounds ,skin lesions-See skin assesment flow sheet   LABS:                        13.9   3.97  )-----------( 219      ( 10 Jul 2025 06:00 )             41.9     07-10    139  |  105  |  12  ----------------------------<  189[H]  5.0   |  25  |  0.80    Ca    9.0      10 Jul 2025 06:00    TPro  6.3  /  Alb  3.1[L]  /  TBili  0.3  /  DBili  0.1  /  AST  36  /  ALT  15  /  AlkPhos  73  07-09    CAPILLARY BLOOD GLUCOSE      POCT Blood Glucose.: 182 mg/dL (10 Jul 2025 07:53)  POCT Blood Glucose.: 201 mg/dL (09 Jul 2025 21:26)  POCT Blood Glucose.: 248 mg/dL (09 Jul 2025 16:53)  POCT Blood Glucose.: 162 mg/dL (09 Jul 2025 12:01)    PT/INR - ( 09 Jul 2025 05:30 )   PT: 11.4 sec;   INR: 0.98 ratio         PTT - ( 08 Jul 2025 18:16 )  PTT:30.0 sec  Urinalysis Basic - ( 10 Jul 2025 06:00 )    Color: x / Appearance: x / SG: x / pH: x  Gluc: 189 mg/dL / Ketone: x  / Bili: x / Urobili: x   Blood: x / Protein: x / Nitrite: x   Leuk Esterase: x / RBC: x / WBC x   Sq Epi: x / Non Sq Epi: x / Bacteria: x        RADIOLOGY & ADDITIONAL TESTS:   reviewed elctronically  ASSESSMENT/PLAN:

## 2025-07-10 NOTE — CONSULT NOTE ADULT - PROBLEM SELECTOR RECOMMENDATION 9
standing insulin regimen on hold  cont fs bg monitoring  cont cons cho diet  goal bg 100-180 in hosp setting
advanced but not end stage  r/o CVA- pt, ot, speech  supportive care
Type 2 A1c 7.2% adm AMS  BG trending >180  start admelog ARA ACHS  10 units Lantus @ HS  Recommend endocrine-Perlman onconsult  FU appt: return to [facility]  DSC recommendations: return to [facility] with basaglar 12 units @ HS, increase metformin 500mg BID, hold standing novolog 4 units premeal. c/w glucose monitoring,  diabetes education provided as documented above  Diabetes support info and cell # [phone] given   Goal 100-180 mg/dL; 140-180 mg/dL in critical care areas

## 2025-07-10 NOTE — PROGRESS NOTE ADULT - SUBJECTIVE AND OBJECTIVE BOX
Neurology follow up note    FREDY HOUSTON74yMale      Interval History:    Patient feels ok no new complaints.    Allergies    Allergy Status Unknown    Intolerances        MEDICATIONS    acetaminophen     Tablet .. 650 milliGRAM(s) Oral every 6 hours PRN  acetaminophen   IVPB .. 1000 milliGRAM(s) IV Intermittent once PRN  aluminum hydroxide/magnesium hydroxide/simethicone Suspension 30 milliLiter(s) Oral every 4 hours PRN  atorvastatin 80 milliGRAM(s) Oral at bedtime  bisacodyl Suppository 10 milliGRAM(s) Rectal daily PRN  dextrose 5%. 1000 milliLiter(s) IV Continuous <Continuous>  dextrose 5%. 1000 milliLiter(s) IV Continuous <Continuous>  dextrose 50% Injectable 25 Gram(s) IV Push once  dextrose 50% Injectable 12.5 Gram(s) IV Push once  dextrose 50% Injectable 25 Gram(s) IV Push once  dextrose Oral Gel 15 Gram(s) Oral once  finasteride 5 milliGRAM(s) Oral daily  glucagon  Injectable 1 milliGRAM(s) IntraMuscular once  haloperidol    Injectable 0.5 milliGRAM(s) IntraMuscular every 6 hours PRN  hydrALAZINE 10 milliGRAM(s) Oral every 8 hours PRN  latanoprost 0.005% Ophthalmic Solution 1 Drop(s) Both EYES at bedtime  lidocaine   4% Patch 1 Patch Transdermal daily  lisinopril 10 milliGRAM(s) Oral daily  melatonin 3 milliGRAM(s) Oral at bedtime PRN  multivitamin 1 Tablet(s) Oral daily  ondansetron Injectable 4 milliGRAM(s) IV Push every 8 hours PRN  pantoprazole  Injectable 40 milliGRAM(s) IV Push daily  polyethylene glycol 3350 17 Gram(s) Oral daily  risperiDONE   Tablet 0.5 milliGRAM(s) Oral daily  thiamine 100 milliGRAM(s) Oral daily              Vital Signs Last 24 Hrs  T(C): 36.4 (10 Jul 2025 05:58), Max: 37.6 (09 Jul 2025 23:51)  T(F): 97.6 (10 Jul 2025 05:58), Max: 99.7 (09 Jul 2025 23:51)  HR: 69 (10 Jul 2025 04:48) (60 - 87)  BP: 147/74 (10 Jul 2025 04:48) (140/57 - 227/82)  BP(mean): 96 (10 Jul 2025 04:48) (81 - 123)  RR: 12 (10 Jul 2025 04:48) (10 - 18)  SpO2: 100% (10 Jul 2025 04:48) (98% - 100%)    Parameters below as of 09 Jul 2025 16:00  Patient On (Oxygen Delivery Method): room air      REVIEW OF SYSTEMS:  Very limited.  The patient has underlying dementia.  He denies any complaint presented to him.    PHYSICAL EXAMINATION:  HEAD:  Normocephalic .  EYES:  Left was opacified.  EARS:  Hearing appeared to be intact.  NECK:  Supple.  CARDIOVASCULAR:  S1, S2 heard.  RESPIRATORY:  Air entry bilaterally.  ABDOMEN:  Soft, nontender.  EXTREMITIES:  No clubbing or cyanosis were noted.    NEUROLOGIC:  The patient is awake, alert, on-off blink to his left eye.  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  Speech was fluent.  Does have a history of cerebrovascular accident with subtle speech abnormalities per daughter.  Motor, bilateral upper and lower 4+ out of 5.  Sensory, bilateral intact to light touch.  Patient could not comprehend complex commands.  Does not no drift to left and right, which is his baseline .      LABS:  CBC Full  -  ( 10 Jul 2025 06:00 )  WBC Count : 3.97 K/uL  RBC Count : 4.49 M/uL  Hemoglobin : 13.9 g/dL  Hematocrit : 41.9 %  Platelet Count - Automated : 219 K/uL  Mean Cell Volume : 93.3 fl  Mean Cell Hemoglobin : 31.0 pg  Mean Cell Hemoglobin Concentration : 33.2 g/dL  Auto Neutrophil # : x  Auto Lymphocyte # : x  Auto Monocyte # : x  Auto Eosinophil # : x  Auto Basophil # : x  Auto Neutrophil % : x  Auto Lymphocyte % : x  Auto Monocyte % : x  Auto Eosinophil % : x  Auto Basophil % : x    Urinalysis Basic - ( 10 Jul 2025 06:00 )    Color: x / Appearance: x / SG: x / pH: x  Gluc: 189 mg/dL / Ketone: x  / Bili: x / Urobili: x   Blood: x / Protein: x / Nitrite: x   Leuk Esterase: x / RBC: x / WBC x   Sq Epi: x / Non Sq Epi: x / Bacteria: x      07-10    139  |  105  |  12  ----------------------------<  189[H]  5.0   |  25  |  0.80    Ca    9.0      10 Jul 2025 06:00    TPro  6.3  /  Alb  3.1[L]  /  TBili  0.3  /  DBili  0.1  /  AST  36  /  ALT  15  /  AlkPhos  73  07-09    Hemoglobin A1C:     LIVER FUNCTIONS - ( 09 Jul 2025 05:30 )  Alb: 3.1 g/dL / Pro: 6.3 g/dL / ALK PHOS: 73 U/L / ALT: 15 U/L / AST: 36 U/L / GGT: x           Vitamin B12   PT/INR - ( 09 Jul 2025 05:30 )   PT: 11.4 sec;   INR: 0.98 ratio         PTT - ( 08 Jul 2025 18:16 )  PTT:30.0 sec      RADIOLOGY      ANALYSIS AND PLAN:  This is a 74-year-old with episode of unresponsiveness and altered mental status.    For episode of unresponsive, most likely secondary hypoglycemia.  Once the patient received sugars, return back to his normal self.  Suspect less likely this is a new primary CNS event and the patient had no prior history to suggest underlying epilepsy.  For history of diabetes, to control blood sugars.  For history of dementia, appears to be advanced, supportive therapy.  Hypertension.  Monitor systolic blood pressure.  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  He does have a history of falls in the past.  These are likely chronic.  Supportive therapy.  Spoke with daughter, Aide, at [phone] 7/9.  She understands the above process.  Neurologic wise stable dc plannng     48 minutes of time spent with patient, plan of care, reviewing data, speaking to multidisciplinary healthcare team with greater than 50% time counseling care and coordination.  Thank you for courtesy of consultation. Neurology follow up note    FREDY HOUSTON74yMale      Interval History:    Patient resting in bed     Allergies    Allergy Status Unknown    Intolerances        MEDICATIONS    acetaminophen     Tablet .. 650 milliGRAM(s) Oral every 6 hours PRN  acetaminophen   IVPB .. 1000 milliGRAM(s) IV Intermittent once PRN  aluminum hydroxide/magnesium hydroxide/simethicone Suspension 30 milliLiter(s) Oral every 4 hours PRN  atorvastatin 80 milliGRAM(s) Oral at bedtime  bisacodyl Suppository 10 milliGRAM(s) Rectal daily PRN  dextrose 5%. 1000 milliLiter(s) IV Continuous <Continuous>  dextrose 5%. 1000 milliLiter(s) IV Continuous <Continuous>  dextrose 50% Injectable 25 Gram(s) IV Push once  dextrose 50% Injectable 12.5 Gram(s) IV Push once  dextrose 50% Injectable 25 Gram(s) IV Push once  dextrose Oral Gel 15 Gram(s) Oral once  finasteride 5 milliGRAM(s) Oral daily  glucagon  Injectable 1 milliGRAM(s) IntraMuscular once  haloperidol    Injectable 0.5 milliGRAM(s) IntraMuscular every 6 hours PRN  hydrALAZINE 10 milliGRAM(s) Oral every 8 hours PRN  latanoprost 0.005% Ophthalmic Solution 1 Drop(s) Both EYES at bedtime  lidocaine   4% Patch 1 Patch Transdermal daily  lisinopril 10 milliGRAM(s) Oral daily  melatonin 3 milliGRAM(s) Oral at bedtime PRN  multivitamin 1 Tablet(s) Oral daily  ondansetron Injectable 4 milliGRAM(s) IV Push every 8 hours PRN  pantoprazole  Injectable 40 milliGRAM(s) IV Push daily  polyethylene glycol 3350 17 Gram(s) Oral daily  risperiDONE   Tablet 0.5 milliGRAM(s) Oral daily  thiamine 100 milliGRAM(s) Oral daily              Vital Signs Last 24 Hrs  T(C): 36.4 (10 Jul 2025 05:58), Max: 37.6 (09 Jul 2025 23:51)  T(F): 97.6 (10 Jul 2025 05:58), Max: 99.7 (09 Jul 2025 23:51)  HR: 69 (10 Jul 2025 04:48) (60 - 87)  BP: 147/74 (10 Jul 2025 04:48) (140/57 - 227/82)  BP(mean): 96 (10 Jul 2025 04:48) (81 - 123)  RR: 12 (10 Jul 2025 04:48) (10 - 18)  SpO2: 100% (10 Jul 2025 04:48) (98% - 100%)    Parameters below as of 09 Jul 2025 16:00  Patient On (Oxygen Delivery Method): room air      REVIEW OF SYSTEMS:  Very limited.  The patient has underlying dementia.  He denies any complaint presented to him.    PHYSICAL EXAMINATION:  HEAD:  Normocephalic .  EYES:  Left was opacified.  EARS:  Hearing appeared to be intact.  NECK:  Supple.  CARDIOVASCULAR:  S1, S2 heard.  RESPIRATORY:  Air entry bilaterally.  ABDOMEN:  Soft, nontender.  EXTREMITIES:  No clubbing or cyanosis were noted.    NEUROLOGIC:  The patient is awake, alert, on-off blink to his left eye.  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  Speech was fluent.  Does have a history of cerebrovascular accident with subtle speech abnormalities per daughter.  Motor, bilateral upper and lower 4+ out of 5.  Sensory, bilateral intact to light touch.  Patient could not comprehend complex commands.  Does not no drift to left and right, which is his baseline .      LABS:  CBC Full  -  ( 10 Jul 2025 06:00 )  WBC Count : 3.97 K/uL  RBC Count : 4.49 M/uL  Hemoglobin : 13.9 g/dL  Hematocrit : 41.9 %  Platelet Count - Automated : 219 K/uL  Mean Cell Volume : 93.3 fl  Mean Cell Hemoglobin : 31.0 pg  Mean Cell Hemoglobin Concentration : 33.2 g/dL  Auto Neutrophil # : x  Auto Lymphocyte # : x  Auto Monocyte # : x  Auto Eosinophil # : x  Auto Basophil # : x  Auto Neutrophil % : x  Auto Lymphocyte % : x  Auto Monocyte % : x  Auto Eosinophil % : x  Auto Basophil % : x    Urinalysis Basic - ( 10 Jul 2025 06:00 )    Color: x / Appearance: x / SG: x / pH: x  Gluc: 189 mg/dL / Ketone: x  / Bili: x / Urobili: x   Blood: x / Protein: x / Nitrite: x   Leuk Esterase: x / RBC: x / WBC x   Sq Epi: x / Non Sq Epi: x / Bacteria: x      07-10    139  |  105  |  12  ----------------------------<  189[H]  5.0   |  25  |  0.80    Ca    9.0      10 Jul 2025 06:00    TPro  6.3  /  Alb  3.1[L]  /  TBili  0.3  /  DBili  0.1  /  AST  36  /  ALT  15  /  AlkPhos  73  07-09    Hemoglobin A1C:     LIVER FUNCTIONS - ( 09 Jul 2025 05:30 )  Alb: 3.1 g/dL / Pro: 6.3 g/dL / ALK PHOS: 73 U/L / ALT: 15 U/L / AST: 36 U/L / GGT: x           Vitamin B12   PT/INR - ( 09 Jul 2025 05:30 )   PT: 11.4 sec;   INR: 0.98 ratio         PTT - ( 08 Jul 2025 18:16 )  PTT:30.0 sec      RADIOLOGY      ANALYSIS AND PLAN:  This is a 74-year-old with episode of unresponsiveness and altered mental status.    For episode of unresponsive, most likely secondary hypoglycemia.  Once the patient received sugars, return back to his normal self.  Suspect less likely this is a new primary CNS event and the patient had no prior history to suggest underlying epilepsy.  For history of diabetes, to control blood sugars.  For history of dementia, appears to be advanced, supportive therapy.  Hypertension.  Monitor systolic blood pressure.  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  He does have a history of falls in the past.  These are likely chronic.  Supportive therapy. Form a neruologic standpoint no need for asa unless needed for medical reasons   Spoke with daughter, Aide, at [phone] 7/9.  She understands the above process.  Neurologic wise stable dc planning     48 minutes of time spent with patient, plan of care, reviewing data, speaking to multidisciplinary healthcare team with greater than 50% time counseling care and coordination.  Thank you for courtesy of consultation.

## 2025-07-10 NOTE — CONSULT NOTE ADULT - SUBJECTIVE AND OBJECTIVE BOX
Patient is a 74y old  Male who presents with a chief complaint of Altered mental status    Per HPI: Patient is 73 yo AAM who was brought in by EMS from [facility] for altered mental status.  Per EMS report patient's last known well was just prior to 5 PM when patient was next seen he was unresponsive.  EMS relates staff at [facility] found the patient to be hypoglycemic with a blood sugar of 49 they gave 2 doses of glucagon and oral glucose paste and blood glucose improved to 71 upon EMS arrival.  EMS reports they were told patient has dementia his baseline is awake and confused which is how the patient presented with EMS.  Patient poor historian secondary to dementia unsure why in ER denies any complaints.  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  - Not a candidate for mechanical thrombectomy w/ distal right vertebral artery occlusion- non amendable to thrombectomy.  - Follow up HgbA1c/ lipid profile - start HD atrovastatin daily   - Bedside dysphagia screen /PT / OT evaluation  - MRI brain w/o contrast  - TTE; r/o cardioembolic event /PFO  - Maintain permissive HTN for 24 hours  (08 Jul 2025 20:54) (09 Jul 2025 10:01)    pt A&Ox2-3, disoriented to time and situation  Type: 2 DM DX unknown, no known complications resides @ [facility], managed by MD Herminio Bird, a1c 7.2% Rx home metfomrin 500mg daily, basaglar 12 units @ HS, novolog 4 units TIDAC. all insulin and BG testing done by staff. pt knows his blood sugar was low but does not remember what happened, discussed need to eat consistently @ home. reviewed s/s of hypoglycemia. BG targets 100-180. verbal education provided  diabetes education provided- A1c measure and BG targets  fasting, <180 2 hours postmeal. medication MOA and considerations/side effects, inhospital BGM frequency and insulin administration, s/s of hyperglycemia/hypoglycemia and management, glycemic control and preventing complications,  HPI:  Patient is 73 yo AAM who was brought in by EMS from [facility] for altered mental status.  Per EMS report patient's last known well was just prior to 5 PM when patient was next seen he was unresponsive.  EMS relates staff at [facility] found the patient to be hypoglycemic with a blood sugar of 49 they gave 2 doses of glucagon and oral glucose paste and blood glucose improved to 71 upon EMS arrival.  EMS reports they were told patient has dementia his baseline is awake and confused which is how the patient presented with EMS.  Patient poor historian secondary to dementia unsure why in ER denies any complaints.  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  - Not a candidate for mechanical thrombectomy w/ distal right vertebral artery occlusion- non amendable to thrombectomy.  - Follow up HgbA1c/ lipid profile - start HD atrovastatin daily   - Bedside dysphagia screen /PT / OT evaluation  - MRI brain w/o contrast  - TTE; r/o cardioembolic event /PFO  - Maintain permissive HTN for 24 hours  (08 Jul 2025 20:54)      PAST MEDICAL & SURGICAL HISTORY:  DM (diabetes mellitus), type 2      HTN (hypertension)      HLD (hyperlipidemia)      Dementia      GERD (gastroesophageal reflux disease)      BPH (benign prostatic hyperplasia)      Anxiety      Glaucoma      DM (diabetes mellitus), type 2    Allergies    Allergy Status Unknown    Intolerances        MEDICATIONS  (STANDING):  amLODIPine   Tablet 5 milliGRAM(s) Oral daily  atorvastatin 80 milliGRAM(s) Oral at bedtime  dextrose 5%. 1000 milliLiter(s) (100 mL/Hr) IV Continuous <Continuous>  dextrose 5%. 1000 milliLiter(s) (50 mL/Hr) IV Continuous <Continuous>  dextrose 50% Injectable 25 Gram(s) IV Push once  dextrose 50% Injectable 12.5 Gram(s) IV Push once  dextrose 50% Injectable 25 Gram(s) IV Push once  dextrose Oral Gel 15 Gram(s) Oral once  finasteride 5 milliGRAM(s) Oral daily  glucagon  Injectable 1 milliGRAM(s) IntraMuscular once  insulin glargine Injectable (LANTUS) 10 Unit(s) SubCutaneous at bedtime  insulin lispro (ADMELOG) corrective regimen sliding scale   SubCutaneous three times a day before meals  insulin lispro (ADMELOG) corrective regimen sliding scale   SubCutaneous at bedtime  latanoprost 0.005% Ophthalmic Solution 1 Drop(s) Both EYES at bedtime  lidocaine   4% Patch 1 Patch Transdermal daily  lisinopril 30 milliGRAM(s) Oral once  multivitamin 1 Tablet(s) Oral daily  pantoprazole  Injectable 40 milliGRAM(s) IV Push daily  polyethylene glycol 3350 17 Gram(s) Oral daily  risperiDONE   Tablet 0.5 milliGRAM(s) Oral daily  thiamine 100 milliGRAM(s) Oral daily

## 2025-07-10 NOTE — CONSULT NOTE ADULT - CONSULT REASON
LOO2
dementia, GOC/ACP
hypoglycemia
Unresponsive episode, ?syncope, ?CVA
74y A1C with Estimated Average Glucose Result: 7.2 % (07-09-25 @ 05:30)   diabetes mellitus uncontrolled type 2

## 2025-07-11 LAB
GLUCOSE BLDC GLUCOMTR-MCNC: 140 MG/DL — HIGH (ref 70–99)
GLUCOSE BLDC GLUCOMTR-MCNC: 212 MG/DL — HIGH (ref 70–99)
GLUCOSE BLDC GLUCOMTR-MCNC: 213 MG/DL — HIGH (ref 70–99)
GLUCOSE BLDC GLUCOMTR-MCNC: 228 MG/DL — HIGH (ref 70–99)
MRSA PCR RESULT.: SIGNIFICANT CHANGE UP
S AUREUS DNA NOSE QL NAA+PROBE: SIGNIFICANT CHANGE UP

## 2025-07-11 PROCEDURE — 99233 SBSQ HOSP IP/OBS HIGH 50: CPT

## 2025-07-11 RX ORDER — ACETAMINOPHEN 500 MG/5ML
2 LIQUID (ML) ORAL
Qty: 0 | Refills: 0 | DISCHARGE
Start: 2025-07-11

## 2025-07-11 RX ORDER — LISINOPRIL 5 MG/1
1 TABLET ORAL
Refills: 0 | DISCHARGE

## 2025-07-11 RX ORDER — AMLODIPINE BESYLATE 10 MG/1
1 TABLET ORAL
Qty: 0 | Refills: 0 | DISCHARGE
Start: 2025-07-11

## 2025-07-11 RX ORDER — RISPERIDONE 4 MG
1 TABLET ORAL
Qty: 0 | Refills: 0 | DISCHARGE
Start: 2025-07-11

## 2025-07-11 RX ORDER — RISPERIDONE 4 MG
1 TABLET ORAL
Refills: 0 | DISCHARGE

## 2025-07-11 RX ORDER — INSULIN GLARGINE-YFGN 100 [IU]/ML
12 INJECTION, SOLUTION SUBCUTANEOUS AT BEDTIME
Refills: 0 | Status: DISCONTINUED | OUTPATIENT
Start: 2025-07-11 | End: 2025-07-12

## 2025-07-11 RX ORDER — FINASTERIDE 1 MG/1
1 TABLET, FILM COATED ORAL
Qty: 0 | Refills: 0 | DISCHARGE
Start: 2025-07-11

## 2025-07-11 RX ORDER — INSULIN LISPRO 100 U/ML
INJECTION, SOLUTION INTRAVENOUS; SUBCUTANEOUS
Refills: 0 | Status: DISCONTINUED | OUTPATIENT
Start: 2025-07-11 | End: 2025-07-12

## 2025-07-11 RX ORDER — HALOPERIDOL 10 MG/1
2 TABLET ORAL ONCE
Refills: 0 | Status: COMPLETED | OUTPATIENT
Start: 2025-07-11 | End: 2025-07-11

## 2025-07-11 RX ORDER — LISINOPRIL 5 MG/1
1 TABLET ORAL
Qty: 0 | Refills: 0 | DISCHARGE
Start: 2025-07-11

## 2025-07-11 RX ORDER — INSULIN GLARGINE-YFGN 100 [IU]/ML
12 INJECTION, SOLUTION SUBCUTANEOUS
Qty: 0 | Refills: 0 | DISCHARGE
Start: 2025-07-11

## 2025-07-11 RX ORDER — INSULIN GLARGINE-YFGN 100 [IU]/ML
12 INJECTION, SOLUTION SUBCUTANEOUS
Refills: 0 | DISCHARGE

## 2025-07-11 RX ORDER — FINASTERIDE 1 MG/1
1 TABLET, FILM COATED ORAL
Refills: 0 | DISCHARGE

## 2025-07-11 RX ADMIN — Medication 40 MILLIGRAM(S): at 12:45

## 2025-07-11 RX ADMIN — LATANOPROST PF 1 DROP(S): 0.05 SOLUTION/ DROPS OPHTHALMIC at 22:20

## 2025-07-11 RX ADMIN — INSULIN GLARGINE-YFGN 12 UNIT(S): 100 INJECTION, SOLUTION SUBCUTANEOUS at 22:21

## 2025-07-11 RX ADMIN — AMLODIPINE BESYLATE 5 MILLIGRAM(S): 10 TABLET ORAL at 06:08

## 2025-07-11 RX ADMIN — INSULIN LISPRO 2: 100 INJECTION, SOLUTION INTRAVENOUS; SUBCUTANEOUS at 08:49

## 2025-07-11 RX ADMIN — LIDOCAINE HYDROCHLORIDE 1 PATCH: 20 JELLY TOPICAL at 19:56

## 2025-07-11 RX ADMIN — POLYETHYLENE GLYCOL 3350 17 GRAM(S): 17 POWDER, FOR SOLUTION ORAL at 12:46

## 2025-07-11 RX ADMIN — LIDOCAINE HYDROCHLORIDE 1 PATCH: 20 JELLY TOPICAL at 12:47

## 2025-07-11 RX ADMIN — Medication 100 MILLIGRAM(S): at 12:46

## 2025-07-11 RX ADMIN — INSULIN LISPRO 2: 100 INJECTION, SOLUTION INTRAVENOUS; SUBCUTANEOUS at 12:46

## 2025-07-11 RX ADMIN — LISINOPRIL 40 MILLIGRAM(S): 5 TABLET ORAL at 06:10

## 2025-07-11 RX ADMIN — FINASTERIDE 5 MILLIGRAM(S): 1 TABLET, FILM COATED ORAL at 12:46

## 2025-07-11 RX ADMIN — Medication 0.5 MILLIGRAM(S): at 12:46

## 2025-07-11 RX ADMIN — HALOPERIDOL 2 MILLIGRAM(S): 10 TABLET ORAL at 02:39

## 2025-07-11 RX ADMIN — ATORVASTATIN CALCIUM 80 MILLIGRAM(S): 80 TABLET, FILM COATED ORAL at 22:20

## 2025-07-11 NOTE — PROGRESS NOTE ADULT - SUBJECTIVE AND OBJECTIVE BOX
Neurology follow up note    FREDY HOUSTON74yMale      Interval History:    Patient resting in bed       Allergies    Allergy Status Unknown    Intolerances        MEDICATIONS    acetaminophen     Tablet .. 650 milliGRAM(s) Oral every 6 hours PRN  acetaminophen   IVPB .. 1000 milliGRAM(s) IV Intermittent once PRN  aluminum hydroxide/magnesium hydroxide/simethicone Suspension 30 milliLiter(s) Oral every 4 hours PRN  amLODIPine   Tablet 5 milliGRAM(s) Oral daily  atorvastatin 80 milliGRAM(s) Oral at bedtime  bisacodyl Suppository 10 milliGRAM(s) Rectal daily PRN  dextrose 5%. 1000 milliLiter(s) IV Continuous <Continuous>  dextrose 5%. 1000 milliLiter(s) IV Continuous <Continuous>  dextrose 50% Injectable 25 Gram(s) IV Push once  dextrose 50% Injectable 12.5 Gram(s) IV Push once  dextrose 50% Injectable 25 Gram(s) IV Push once  dextrose Oral Gel 15 Gram(s) Oral once  finasteride 5 milliGRAM(s) Oral daily  glucagon  Injectable 1 milliGRAM(s) IntraMuscular once  haloperidol    Injectable 0.5 milliGRAM(s) IntraMuscular every 6 hours PRN  insulin glargine Injectable (LANTUS) 10 Unit(s) SubCutaneous at bedtime  insulin lispro (ADMELOG) corrective regimen sliding scale   SubCutaneous three times a day before meals  insulin lispro (ADMELOG) corrective regimen sliding scale   SubCutaneous at bedtime  latanoprost 0.005% Ophthalmic Solution 1 Drop(s) Both EYES at bedtime  lidocaine   4% Patch 1 Patch Transdermal daily  lisinopril 40 milliGRAM(s) Oral daily  melatonin 3 milliGRAM(s) Oral at bedtime PRN  multivitamin 1 Tablet(s) Oral daily  ondansetron Injectable 4 milliGRAM(s) IV Push every 8 hours PRN  pantoprazole  Injectable 40 milliGRAM(s) IV Push daily  polyethylene glycol 3350 17 Gram(s) Oral daily  risperiDONE   Tablet 0.5 milliGRAM(s) Oral daily  thiamine 100 milliGRAM(s) Oral daily          Height (cm): 170.2 (07-10 @ 09:30)  Weight (kg): 53.3 (07-10 @ 09:30)  BMI (kg/m2): 18.4 (07-10 @ 09:30)    Vital Signs Last 24 Hrs  T(C): 36.7 (11 Jul 2025 05:13), Max: 37.4 (10 Jul 2025 16:29)  T(F): 98 (11 Jul 2025 05:13), Max: 99.4 (10 Jul 2025 16:29)  HR: 76 (11 Jul 2025 05:13) (76 - 106)  BP: 177/91 (11 Jul 2025 05:13) (104/91 - 177/91)  BP(mean): 94 (10 Jul 2025 18:09) (72 - 124)  RR: 20 (11 Jul 2025 05:13) (14 - 20)  SpO2: 97% (11 Jul 2025 05:13) (97% - 100%)    Parameters below as of 11 Jul 2025 05:13  Patient On (Oxygen Delivery Method): room air      REVIEW OF SYSTEMS:  Very limited.  The patient has underlying dementia.  He denies any complaint presented to him.    PHYSICAL EXAMINATION:  HEAD:  Normocephalic .  EYES:  Left was opacified.  EARS:  Hearing appeared to be intact.  NECK:  Supple.  CARDIOVASCULAR:  S1, S2 heard.  RESPIRATORY:  Air entry bilaterally.  ABDOMEN:  Soft, nontender.  EXTREMITIES:  No clubbing or cyanosis were noted.    NEUROLOGIC:  The patient is awake, alert, on-off blink to his left eye.  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  Speech was fluent.  Does have a history of cerebrovascular accident with subtle speech abnormalities per daughter.  Motor, bilateral upper and lower 4+ out of 5.  Sensory, bilateral intact to light touch.  Patient could not comprehend complex commands.  Does not no drift to left and right, which is his baseline .          LABS:  CBC Full  -  ( 10 Jul 2025 06:00 )  WBC Count : 3.97 K/uL  RBC Count : 4.49 M/uL  Hemoglobin : 13.9 g/dL  Hematocrit : 41.9 %  Platelet Count - Automated : 219 K/uL  Mean Cell Volume : 93.3 fl  Mean Cell Hemoglobin : 31.0 pg  Mean Cell Hemoglobin Concentration : 33.2 g/dL  Auto Neutrophil # : x  Auto Lymphocyte # : x  Auto Monocyte # : x  Auto Eosinophil # : x  Auto Basophil # : x  Auto Neutrophil % : x  Auto Lymphocyte % : x  Auto Monocyte % : x  Auto Eosinophil % : x  Auto Basophil % : x    Urinalysis Basic - ( 10 Jul 2025 06:00 )    Color: x / Appearance: x / SG: x / pH: x  Gluc: 189 mg/dL / Ketone: x  / Bili: x / Urobili: x   Blood: x / Protein: x / Nitrite: x   Leuk Esterase: x / RBC: x / WBC x   Sq Epi: x / Non Sq Epi: x / Bacteria: x      07-10    139  |  105  |  12  ----------------------------<  189[H]  5.0   |  25  |  0.80    Ca    9.0      10 Jul 2025 06:00      Hemoglobin A1C:       Vitamin B12         RADIOLOGY    ANALYSIS AND PLAN:  This is a 74-year-old with episode of unresponsiveness and altered mental status.    For episode of unresponsive, most likely secondary hypoglycemia.  Once the patient received sugars, return back to his normal self.  Suspect less likely this is a new primary CNS event and the patient had no prior history to suggest underlying epilepsy.  For history of diabetes, to control blood sugars.  For history of dementia, appears to be advanced, supportive therapy.  Hypertension.  Monitor systolic blood pressure.  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  He does have a history of falls in the past.  These are likely chronic.  Supportive therapy. Form a neruologic standpoint no need for asa unless needed for medical reasons   Spoke with daughter, Aide, at [phone] 7/9.  She understands the above process.  Neurologic wise stable dc planning     48 minutes of time spent with patient, plan of care, reviewing data, speaking to multidisciplinary healthcare team with greater than 50% time counseling care and coordination.  Thank you for courtesy of consultation.      PATIENT HAS NOT YET BEEN SEEN AND EXAMINED TODAY. NOTE AND CHART REVIEWED IN AM AND EXAM FORM PREVIOUS.  ONCE PATIENT SEEN, CHART WILL BE UPDATE AT PRESENT NOTE IS INCOMPLETE     Neurology follow up note    FREDY HOUSTON74yMale      Interval History:    Patient resting in bed       Allergies    Allergy Status Unknown    Intolerances        MEDICATIONS    acetaminophen     Tablet .. 650 milliGRAM(s) Oral every 6 hours PRN  acetaminophen   IVPB .. 1000 milliGRAM(s) IV Intermittent once PRN  aluminum hydroxide/magnesium hydroxide/simethicone Suspension 30 milliLiter(s) Oral every 4 hours PRN  amLODIPine   Tablet 5 milliGRAM(s) Oral daily  atorvastatin 80 milliGRAM(s) Oral at bedtime  bisacodyl Suppository 10 milliGRAM(s) Rectal daily PRN  dextrose 5%. 1000 milliLiter(s) IV Continuous <Continuous>  dextrose 5%. 1000 milliLiter(s) IV Continuous <Continuous>  dextrose 50% Injectable 25 Gram(s) IV Push once  dextrose 50% Injectable 12.5 Gram(s) IV Push once  dextrose 50% Injectable 25 Gram(s) IV Push once  dextrose Oral Gel 15 Gram(s) Oral once  finasteride 5 milliGRAM(s) Oral daily  glucagon  Injectable 1 milliGRAM(s) IntraMuscular once  haloperidol    Injectable 0.5 milliGRAM(s) IntraMuscular every 6 hours PRN  insulin glargine Injectable (LANTUS) 10 Unit(s) SubCutaneous at bedtime  insulin lispro (ADMELOG) corrective regimen sliding scale   SubCutaneous three times a day before meals  insulin lispro (ADMELOG) corrective regimen sliding scale   SubCutaneous at bedtime  latanoprost 0.005% Ophthalmic Solution 1 Drop(s) Both EYES at bedtime  lidocaine   4% Patch 1 Patch Transdermal daily  lisinopril 40 milliGRAM(s) Oral daily  melatonin 3 milliGRAM(s) Oral at bedtime PRN  multivitamin 1 Tablet(s) Oral daily  ondansetron Injectable 4 milliGRAM(s) IV Push every 8 hours PRN  pantoprazole  Injectable 40 milliGRAM(s) IV Push daily  polyethylene glycol 3350 17 Gram(s) Oral daily  risperiDONE   Tablet 0.5 milliGRAM(s) Oral daily  thiamine 100 milliGRAM(s) Oral daily          Height (cm): 170.2 (07-10 @ 09:30)  Weight (kg): 53.3 (07-10 @ 09:30)  BMI (kg/m2): 18.4 (07-10 @ 09:30)    Vital Signs Last 24 Hrs  T(C): 36.7 (11 Jul 2025 05:13), Max: 37.4 (10 Jul 2025 16:29)  T(F): 98 (11 Jul 2025 05:13), Max: 99.4 (10 Jul 2025 16:29)  HR: 76 (11 Jul 2025 05:13) (76 - 106)  BP: 177/91 (11 Jul 2025 05:13) (104/91 - 177/91)  BP(mean): 94 (10 Jul 2025 18:09) (72 - 124)  RR: 20 (11 Jul 2025 05:13) (14 - 20)  SpO2: 97% (11 Jul 2025 05:13) (97% - 100%)    Parameters below as of 11 Jul 2025 05:13  Patient On (Oxygen Delivery Method): room air      REVIEW OF SYSTEMS:  Very limited.  The patient has underlying dementia.  He denies any complaint presented to him.    PHYSICAL EXAMINATION:  HEAD:  Normocephalic .  EYES:  Left was opacified.  EARS:  Hearing appeared to be intact.  NECK:  Supple.  CARDIOVASCULAR:  S1, S2 heard.  RESPIRATORY:  Air entry bilaterally.  ABDOMEN:  Soft, nontender.  EXTREMITIES:  No clubbing or cyanosis were noted.    NEUROLOGIC:  The patient is awake, alert, on-off blink to his left eye.  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  Speech was fluent.  Does have a history of cerebrovascular accident with subtle speech abnormalities per daughter.  Motor, bilateral upper and lower 4+ out of 5.  Sensory, bilateral intact to light touch.  Patient could not comprehend complex commands.  Does not no drift to left and right, which is his baseline .          LABS:  CBC Full  -  ( 10 Jul 2025 06:00 )  WBC Count : 3.97 K/uL  RBC Count : 4.49 M/uL  Hemoglobin : 13.9 g/dL  Hematocrit : 41.9 %  Platelet Count - Automated : 219 K/uL  Mean Cell Volume : 93.3 fl  Mean Cell Hemoglobin : 31.0 pg  Mean Cell Hemoglobin Concentration : 33.2 g/dL  Auto Neutrophil # : x  Auto Lymphocyte # : x  Auto Monocyte # : x  Auto Eosinophil # : x  Auto Basophil # : x  Auto Neutrophil % : x  Auto Lymphocyte % : x  Auto Monocyte % : x  Auto Eosinophil % : x  Auto Basophil % : x    Urinalysis Basic - ( 10 Jul 2025 06:00 )    Color: x / Appearance: x / SG: x / pH: x  Gluc: 189 mg/dL / Ketone: x  / Bili: x / Urobili: x   Blood: x / Protein: x / Nitrite: x   Leuk Esterase: x / RBC: x / WBC x   Sq Epi: x / Non Sq Epi: x / Bacteria: x      07-10    139  |  105  |  12  ----------------------------<  189[H]  5.0   |  25  |  0.80    Ca    9.0      10 Jul 2025 06:00      Hemoglobin A1C:       Vitamin B12         RADIOLOGY    ANALYSIS AND PLAN:  This is a 74-year-old with episode of unresponsiveness and altered mental status.    For episode of unresponsive, most likely secondary hypoglycemia.  Once the patient received sugars, return back to his normal self.  Suspect less likely this is a new primary CNS event and the patient had no prior history to suggest underlying epilepsy.  For history of diabetes, to control blood sugars.  For history of dementia, appears to be advanced, supportive therapy.  Hypertension.  Monitor systolic blood pressure.  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  He does have a history of falls in the past.  These are likely chronic.  Supportive therapy. Form a neurologic standpoint no need for asa unless needed for medical reasons   Spoke with daughter, Aide, at [phone] 7/10.  She understands the above process.  Neurologic wise stable dc planning     48 minutes of time spent with patient, plan of care, reviewing data, speaking to multidisciplinary healthcare team with greater than 50% time counseling care and coordination.  Thank you for courtesy of consultation.

## 2025-07-11 NOTE — PROGRESS NOTE ADULT - SUBJECTIVE AND OBJECTIVE BOX
PROGRESS NOTE  Patient is a 74y old  Male who presents with a chief complaint of Altered mental status    Per HPI: Patient is 75 yo AAM who was brought in by EMS from [facility] for altered mental status.  Per EMS report patient's last known well was just prior to 5 PM when patient was next seen he was unresponsive.  EMS relates staff at [facility] found the patient to be hypoglycemic with a blood sugar of 49 they gave 2 doses of glucagon and oral glucose paste and blood glucose improved to 71 upon EMS arrival.  EMS reports they were told patient has dementia his baseline is awake and confused which is how the patient presented with EMS.  Patient poor historian secondary to dementia unsure why in ER denies any complaints.  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  - Not a candidate for mechanical thrombectomy w/ distal right vertebral artery occlusion- non amendable to thrombectomy.  - Follow up HgbA1c/ lipid profile - start HD atrovastatin daily   - Bedside dysphagia screen /PT / OT evaluation  - MRI brain w/o contrast  - TTE; r/o cardioembolic event /PFO  - Maintain permissive HTN for 24 hours  (08 Jul 2025 20:54) (09 Jul 2025 10:01)  Chart and available morning labs /imaging are reviewed electronically , urgent issues addressed . More information  is being added upon completion of rounds , when more information is collected and management discussed with consultants , medical staff and social service/case management on the floor   OVERNIGHT  No new issues reported by medical staff . All above noted Patient is resting in a bed comfortably  .No distress noted Family requests patient to be discharged home since daughter is coming back from vacation tonight  HPI:  Patient is 75 yo AAM who was brought in by EMS from [facility] for altered mental status.  Per EMS report patient's last known well was just prior to 5 PM when patient was next seen he was unresponsive.  EMS relates staff at [facility] found the patient to be hypoglycemic with a blood sugar of 49 they gave 2 doses of glucagon and oral glucose paste and blood glucose improved to 71 upon EMS arrival.  EMS reports they were told patient has dementia his baseline is awake and confused which is how the patient presented with EMS.  Patient poor historian secondary to dementia unsure why in ER denies any complaints.  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  - Not a candidate for mechanical thrombectomy w/ distal right vertebral artery occlusion- non amendable to thrombectomy.  - Follow up HgbA1c/ lipid profile - start HD atrovastatin daily   - Bedside dysphagia screen /PT / OT evaluation  - MRI brain w/o contrast  - TTE; r/o cardioembolic event /PFO  - Maintain permissive HTN for 24 hours  (08 Jul 2025 20:54)    PAST MEDICAL & SURGICAL HISTORY:  DM (diabetes mellitus), type 2      Glaucoma      DM (diabetes mellitus), type 2      HTN (hypertension)      HLD (hyperlipidemia)      Dementia      BPH (benign prostatic hyperplasia)      GERD (gastroesophageal reflux disease)      Anxiety          MEDICATIONS  (STANDING):  amLODIPine   Tablet 5 milliGRAM(s) Oral daily  atorvastatin 80 milliGRAM(s) Oral at bedtime  dextrose 5%. 1000 milliLiter(s) (100 mL/Hr) IV Continuous <Continuous>  dextrose 5%. 1000 milliLiter(s) (50 mL/Hr) IV Continuous <Continuous>  dextrose 50% Injectable 25 Gram(s) IV Push once  dextrose 50% Injectable 12.5 Gram(s) IV Push once  dextrose 50% Injectable 25 Gram(s) IV Push once  dextrose Oral Gel 15 Gram(s) Oral once  finasteride 5 milliGRAM(s) Oral daily  glucagon  Injectable 1 milliGRAM(s) IntraMuscular once  insulin glargine Injectable (LANTUS) 10 Unit(s) SubCutaneous at bedtime  insulin lispro (ADMELOG) corrective regimen sliding scale   SubCutaneous three times a day before meals  insulin lispro (ADMELOG) corrective regimen sliding scale   SubCutaneous at bedtime  latanoprost 0.005% Ophthalmic Solution 1 Drop(s) Both EYES at bedtime  lidocaine   4% Patch 1 Patch Transdermal daily  lisinopril 40 milliGRAM(s) Oral daily  multivitamin 1 Tablet(s) Oral daily  pantoprazole  Injectable 40 milliGRAM(s) IV Push daily  polyethylene glycol 3350 17 Gram(s) Oral daily  risperiDONE   Tablet 0.5 milliGRAM(s) Oral daily  thiamine 100 milliGRAM(s) Oral daily    MEDICATIONS  (PRN):  acetaminophen     Tablet .. 650 milliGRAM(s) Oral every 6 hours PRN Temp greater or equal to 38C (100.4F), Mild Pain (1 - 3)  acetaminophen   IVPB .. 1000 milliGRAM(s) IV Intermittent once PRN Temp greater or equal to 38.5C (101.3F), Moderate Pain (4 - 6)  aluminum hydroxide/magnesium hydroxide/simethicone Suspension 30 milliLiter(s) Oral every 4 hours PRN Dyspepsia  bisacodyl Suppository 10 milliGRAM(s) Rectal daily PRN Constipation  haloperidol    Injectable 0.5 milliGRAM(s) IntraMuscular every 6 hours PRN Agitation  melatonin 3 milliGRAM(s) Oral at bedtime PRN Insomnia  ondansetron Injectable 4 milliGRAM(s) IV Push every 8 hours PRN Nausea and/or Vomiting      OBJECTIVE    T(C): 36.5 (07-11-25 @ 08:13), Max: 37.4 (07-10-25 @ 16:29)  HR: 77 (07-11-25 @ 11:46) (76 - 103)  BP: 158/100 (07-11-25 @ 11:46) (104/91 - 177/91)  RR: 18 (07-11-25 @ 11:46) (15 - 20)  SpO2: 95% (07-11-25 @ 11:46) (95% - 100%)  Wt(kg): --  I&O's Summary    10 Jul 2025 07:01  -  11 Jul 2025 07:00  --------------------------------------------------------  IN: 0 mL / OUT: 600 mL / NET: -600 mL    11 Jul 2025 07:01  -  11 Jul 2025 13:42  --------------------------------------------------------  IN: 0 mL / OUT: 100 mL / NET: -100 mL          REVIEW OF SYSTEMS:  CONSTITUTIONAL: No fever, weight loss, or fatigue  EYES: No eye pain, visual disturbances, or discharge  ENMT:   No sinus or throat pain  NECK: No pain or stiffness  RESPIRATORY: No cough, wheezing, chills or hemoptysis; No shortness of breath  CARDIOVASCULAR: No chest pain, palpitations, dizziness, or leg swelling  GASTROINTESTINAL: No abdominal pain. No nausea, vomiting; No diarrhea or constipation. No melena or hematochezia.  GENITOURINARY: No dysuria, frequency, hematuria, or incontinence  NEUROLOGICAL: No headaches, memory loss, loss of strength, numbness, or tremors  SKIN: No itching, burning, rashes, or lesions   MUSCULOSKELETAL: No joint pain or swelling; No muscle, back, or extremity pain    PHYSICAL EXAM:  Appearance: NAD. VS past 24 hrs -as above   HEENT:   Moist oral mucosa. Conjunctiva clear b/l.   Neck : supple  Respiratory: Lungs CTAB.  Gastrointestinal:  Soft, nontender. No rebound. No rigidity. BS present	  Cardiovascular: RRR ,S1S2 present  Neurologic: Non-focal. Moving all extremities.  Extremities: No edema. No erythema. No calf tenderness.  Skin: No rashes, No ecchymoses, No cyanosis.	  wounds ,skin lesions-See skin assesment flow sheet   LABS:                        13.9   3.97  )-----------( 219      ( 10 Jul 2025 06:00 )             41.9     07-10    139  |  105  |  12  ----------------------------<  189[H]  5.0   |  25  |  0.80    Ca    9.0      10 Jul 2025 06:00      CAPILLARY BLOOD GLUCOSE      POCT Blood Glucose.: 212 mg/dL (11 Jul 2025 12:44)  POCT Blood Glucose.: 213 mg/dL (11 Jul 2025 08:21)  POCT Blood Glucose.: 140 mg/dL (10 Jul 2025 21:26)  POCT Blood Glucose.: 210 mg/dL (10 Jul 2025 17:02)      Urinalysis Basic - ( 10 Jul 2025 06:00 )    Color: x / Appearance: x / SG: x / pH: x  Gluc: 189 mg/dL / Ketone: x  / Bili: x / Urobili: x   Blood: x / Protein: x / Nitrite: x   Leuk Esterase: x / RBC: x / WBC x   Sq Epi: x / Non Sq Epi: x / Bacteria: x  RADIOLOGY & ADDITIONAL TESTS:   reviewed elctronically  ASSESSMENT/PLAN: 	  25 minutes aggregate time was spent on this visit, 50% visit time spent in care co-ordination with other attendings and counselling patient .I have discussed care plan with patient / HCP/family member ,who expressed understanding of problems treatment and their effect and side effects, alternatives in details. I have asked if they have any questions and concerns and appropriately addressed them to best of my ability. ACP-Advance care planning was discussed , pallitaive care issues ,CMO ,GOC ,MOLST  form ,advance directives were reviewed .All questions were answered to the best of my knowledge - 25 m

## 2025-07-11 NOTE — PROGRESS NOTE ADULT - PROBLEM SELECTOR PLAN 1
cont lantus 10 units qhs  cont low dose admelog corrective scale coverage qac/qhs  pt to return to [facility] with long-acting insulin 12 units qhs; metformin 500mg bid  diabetes education completed  goal bg 100-180 in hosp setting
likely 2/2 to hypoglycemic event - FS monitoring , hypoglycemia protocol , diabetic team evaluation requested , continue FS monitoring
Type 2 A1c 7.2% adm AMS  increase lantus to 12 units @ HS  increase to mod ISS ACHS  Recommend endocrine-Perlman onconsult  FU appt: TBA  DSC recommendations: return to home with basaglar 12 units @ HS and metformin 500mg BID and glucose monitoring, lifestyle and diet modification  diabetes education provided  Diabetes support info and cell # [phone] given   Goal 100-180 mg/dL; 140-180 mg/dL in critical care areas
likely 2/2 to hypoglycemic event - FS monitoring , hypoglycemia protocol , diabetic team evaluation requested , continue FS monitoring
likely 2/2 to hypoglycemic event - FS monitoring , hypoglycemia protocol , diabetic team evaluation requested , continue FS monitoring

## 2025-07-11 NOTE — PROGRESS NOTE ADULT - SUBJECTIVE AND OBJECTIVE BOX
CAPILLARY BLOOD GLUCOSE      POCT Blood Glucose.: 140 mg/dL (10 Jul 2025 21:26)  POCT Blood Glucose.: 210 mg/dL (10 Jul 2025 17:02)  POCT Blood Glucose.: 259 mg/dL (10 Jul 2025 12:30)  POCT Blood Glucose.: 182 mg/dL (10 Jul 2025 07:53)      Vital Signs Last 24 Hrs  T(C): 36.7 (11 Jul 2025 05:13), Max: 37.4 (10 Jul 2025 16:29)  T(F): 98 (11 Jul 2025 05:13), Max: 99.4 (10 Jul 2025 16:29)  HR: 76 (11 Jul 2025 05:13) (76 - 106)  BP: 177/91 (11 Jul 2025 05:13) (104/91 - 177/91)  BP(mean): 94 (10 Jul 2025 18:09) (72 - 124)  RR: 20 (11 Jul 2025 05:13) (14 - 20)  SpO2: 97% (11 Jul 2025 05:13) (97% - 100%)    Parameters below as of 11 Jul 2025 05:13  Patient On (Oxygen Delivery Method): room air      Respiratory: CTA B/L  CV: RRR, S1S2, no murmurs, rubs or gallops  Abdominal: Soft, NT, ND +BS, Last BM  Extremities: No edema, + peripheral pulses     07-10    139  |  105  |  12  ----------------------------<  189[H]  5.0   |  25  |  0.80    Ca    9.0      10 Jul 2025 06:00        atorvastatin 80 milliGRAM(s) Oral at bedtime  dextrose 50% Injectable 25 Gram(s) IV Push once  dextrose 50% Injectable 12.5 Gram(s) IV Push once  dextrose 50% Injectable 25 Gram(s) IV Push once  dextrose Oral Gel 15 Gram(s) Oral once  finasteride 5 milliGRAM(s) Oral daily  glucagon  Injectable 1 milliGRAM(s) IntraMuscular once  insulin glargine Injectable (LANTUS) 10 Unit(s) SubCutaneous at bedtime  insulin lispro (ADMELOG) corrective regimen sliding scale   SubCutaneous three times a day before meals  insulin lispro (ADMELOG) corrective regimen sliding scale   SubCutaneous at bedtime

## 2025-07-11 NOTE — PROGRESS NOTE ADULT - PROBLEM SELECTOR PLAN 4
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  - Not a candidate for mechanical thrombectomy w/ distal right vertebral artery occlusion- non amendable to thrombectomy.  - Follow up HgbA1c/ lipid profile - start HD Atorvastatin daily   - Bedside dysphagia screen /PT / OT evaluation  - Seen by neurologist 07/09 -no neurological event suspected , cleared for d/c

## 2025-07-11 NOTE — PROGRESS NOTE ADULT - PROBLEM SELECTOR PLAN 3
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  - Not a candidate for mechanical thrombectomy w/ distal right vertebral artery occlusion- non amendable to thrombectomy.  - Follow up HgbA1c/ lipid profile - start HD Atorvastatin daily   - Bedside dysphagia screen /PT / OT evaluation  - Seen by neurologist 07/09 -no neurological event suspected , cleared for d/c , no aspirin advised

## 2025-07-11 NOTE — PROGRESS NOTE ADULT - PROBLEM SELECTOR PLAN 11
Supportive care ,frequent redirection ,continue home medications ,management of agitation as needed

## 2025-07-11 NOTE — PROGRESS NOTE ADULT - SUBJECTIVE AND OBJECTIVE BOX
Chief Complaint: AMS    Interval Events: No events overnight.    Review of Systems:  General: No fevers, chills, weight gain  Skin: No rashes, color changes  Cardiovascular: No chest pain, orthopnea  Respiratory: No shortness of breath, cough  Gastrointestinal: No nausea, abdominal pain  Genitourinary: No incontinence, pain with urination  Musculoskeletal: No pain, swelling, decreased range of motion  Neurological: No headache, weakness  Psychiatric: No depression, anxiety  Endocrine: No weight gain, increased thirst  All other systems are comprehensively negative.    Physical Exam:  Vital Signs Last 24 Hrs  T(C): 36.5 (11 Jul 2025 08:13), Max: 37.4 (10 Jul 2025 16:29)  T(F): 97.7 (11 Jul 2025 08:13), Max: 99.4 (10 Jul 2025 16:29)  HR: 76 (11 Jul 2025 05:13) (76 - 106)  BP: 177/91 (11 Jul 2025 05:13) (104/91 - 177/91)  BP(mean): 94 (10 Jul 2025 18:09) (94 - 124)  RR: 20 (11 Jul 2025 05:13) (15 - 20)  SpO2: 97% (11 Jul 2025 05:13) (97% - 100%)  Parameters below as of 11 Jul 2025 05:13  Patient On (Oxygen Delivery Method): room air  General: NAD  HEENT: MMM  Neck: No JVD, no carotid bruit  Lungs: CTAB  CV: RRR, nl S1/S2, no M/R/G  Abdomen: S/NT/ND, +BS  Extremities: No LE edema, no cyanosis  Neuro: AAOx1  Skin: No rash    Labs:             07-10    139  |  105  |  12  ----------------------------<  189[H]  5.0   |  25  |  0.80    Ca    9.0      10 Jul 2025 06:00                          13.9   3.97  )-----------( 219      ( 10 Jul 2025 06:00 )             41.9       ECG/Telemetry: Sinus rhythm

## 2025-07-11 NOTE — PROGRESS NOTE ADULT - SUBJECTIVE AND OBJECTIVE BOX
Patient is a 74y old  Male who presents with a chief complaint of Altered mental status    Per HPI: Patient is 73 yo AAM who was brought in by EMS from [facility] for altered mental status.  Per EMS report patient's last known well was just prior to 5 PM when patient was next seen he was unresponsive.  EMS relates staff at [facility] found the patient to be hypoglycemic with a blood sugar of 49 they gave 2 doses of glucagon and oral glucose paste and blood glucose improved to 71 upon EMS arrival.  EMS reports they were told patient has dementia his baseline is awake and confused which is how the patient presented with EMS.  Patient poor historian secondary to dementia unsure why in ER denies any complaints.  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  - Not a candidate for mechanical thrombectomy w/ distal right vertebral artery occlusion- non amendable to thrombectomy.  - Follow up HgbA1c/ lipid profile - start HD atrovastatin daily   - Bedside dysphagia screen /PT / OT evaluation  - MRI brain w/o contrast  - TTE; r/o cardioembolic event /PFO  - Maintain permissive HTN for 24 hours  (08 Jul 2025 20:54) (09 Jul 2025 10:01)    updates: seen patient @ bedside, A&Ox2, no complaints. reviewed BG trends 24 hours, running >goal 100-180mg/dL. increase basal insulin and ISS. dsc planning for patient to go home vs JOSE RAFAEL? pt does not have capacity to self-administer insulin, monitor BG independently. left message with daughter DARIA HOUSTON [phone] to evaluate insulin administration and injection site education.    HPI:  Patient is 73 yo AAM who was brought in by EMS from [facility] for altered mental status.  Per EMS report patient's last known well was just prior to 5 PM when patient was next seen he was unresponsive.  EMS relates staff at [facility] found the patient to be hypoglycemic with a blood sugar of 49 they gave 2 doses of glucagon and oral glucose paste and blood glucose improved to 71 upon EMS arrival.  EMS reports they were told patient has dementia his baseline is awake and confused which is how the patient presented with EMS.  Patient poor historian secondary to dementia unsure why in ER denies any complaints.  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  - Not a candidate for mechanical thrombectomy w/ distal right vertebral artery occlusion- non amendable to thrombectomy.  - Follow up HgbA1c/ lipid profile - start HD atrovastatin daily   - Bedside dysphagia screen /PT / OT evaluation  - MRI brain w/o contrast  - TTE; r/o cardioembolic event /PFO  - Maintain permissive HTN for 24 hours  (08 Jul 2025 20:54)      PAST MEDICAL & SURGICAL HISTORY:  DM (diabetes mellitus), type 2      HTN (hypertension)      HLD (hyperlipidemia)      Dementia      GERD (gastroesophageal reflux disease)      BPH (benign prostatic hyperplasia)      Anxiety      Glaucoma      DM (diabetes mellitus), type 2      Allergies    Allergy Status Unknown    Intolerances        MEDICATIONS  (STANDING):  amLODIPine   Tablet 5 milliGRAM(s) Oral daily  atorvastatin 80 milliGRAM(s) Oral at bedtime  dextrose 5%. 1000 milliLiter(s) (100 mL/Hr) IV Continuous <Continuous>  dextrose 5%. 1000 milliLiter(s) (50 mL/Hr) IV Continuous <Continuous>  dextrose 50% Injectable 25 Gram(s) IV Push once  dextrose 50% Injectable 12.5 Gram(s) IV Push once  dextrose 50% Injectable 25 Gram(s) IV Push once  dextrose Oral Gel 15 Gram(s) Oral once  finasteride 5 milliGRAM(s) Oral daily  glucagon  Injectable 1 milliGRAM(s) IntraMuscular once  insulin glargine Injectable (LANTUS) 12 Unit(s) SubCutaneous at bedtime  insulin lispro (ADMELOG) corrective regimen sliding scale   SubCutaneous at bedtime  insulin lispro (ADMELOG) corrective regimen sliding scale   SubCutaneous three times a day before meals  latanoprost 0.005% Ophthalmic Solution 1 Drop(s) Both EYES at bedtime  lidocaine   4% Patch 1 Patch Transdermal daily  lisinopril 40 milliGRAM(s) Oral daily  multivitamin 1 Tablet(s) Oral daily  pantoprazole  Injectable 40 milliGRAM(s) IV Push daily  polyethylene glycol 3350 17 Gram(s) Oral daily  risperiDONE   Tablet 0.5 milliGRAM(s) Oral daily  thiamine 100 milliGRAM(s) Oral daily       Patient is a 74y old  Male who presents with a chief complaint of Altered mental status    Per HPI: Patient is 73 yo AAM who was brought in by EMS from [facility] for altered mental status.  Per EMS report patient's last known well was just prior to 5 PM when patient was next seen he was unresponsive.  EMS relates staff at [facility] found the patient to be hypoglycemic with a blood sugar of 49 they gave 2 doses of glucagon and oral glucose paste and blood glucose improved to 71 upon EMS arrival.  EMS reports they were told patient has dementia his baseline is awake and confused which is how the patient presented with EMS.  Patient poor historian secondary to dementia unsure why in ER denies any complaints.  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  - Not a candidate for mechanical thrombectomy w/ distal right vertebral artery occlusion- non amendable to thrombectomy.  - Follow up HgbA1c/ lipid profile - start HD atrovastatin daily   - Bedside dysphagia screen /PT / OT evaluation  - MRI brain w/o contrast  - TTE; r/o cardioembolic event /PFO  - Maintain permissive HTN for 24 hours  (08 Jul 2025 20:54) (09 Jul 2025 10:01)    updates: seen patient @ bedside, A&Ox2, no complaints. reviewed BG trends 24 hours, running >goal 100-180mg/dL. increase basal insulin and ISS. dsc planning for patient to go home vs JOSE RAFAEL? pt does not have capacity to self-administer insulin, monitor BG independently. left message with daughter DARIA HOUSTON [phone] to evaluate insulin administration and injection site education. hypoglycemia and hyperglycemia s/s and management, sick day management, provider f/u    HPI:  Patient is 73 yo AAM who was brought in by EMS from [facility] for altered mental status.  Per EMS report patient's last known well was just prior to 5 PM when patient was next seen he was unresponsive.  EMS relates staff at [facility] found the patient to be hypoglycemic with a blood sugar of 49 they gave 2 doses of glucagon and oral glucose paste and blood glucose improved to 71 upon EMS arrival.  EMS reports they were told patient has dementia his baseline is awake and confused which is how the patient presented with EMS.  Patient poor historian secondary to dementia unsure why in ER denies any complaints.  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  - Not a candidate for mechanical thrombectomy w/ distal right vertebral artery occlusion- non amendable to thrombectomy.  - Follow up HgbA1c/ lipid profile - start HD atrovastatin daily   - Bedside dysphagia screen /PT / OT evaluation  - MRI brain w/o contrast  - TTE; r/o cardioembolic event /PFO  - Maintain permissive HTN for 24 hours  (08 Jul 2025 20:54)      PAST MEDICAL & SURGICAL HISTORY:  DM (diabetes mellitus), type 2      HTN (hypertension)      HLD (hyperlipidemia)      Dementia      GERD (gastroesophageal reflux disease)      BPH (benign prostatic hyperplasia)      Anxiety      Glaucoma      DM (diabetes mellitus), type 2      Allergies    Allergy Status Unknown    Intolerances        MEDICATIONS  (STANDING):  amLODIPine   Tablet 5 milliGRAM(s) Oral daily  atorvastatin 80 milliGRAM(s) Oral at bedtime  dextrose 5%. 1000 milliLiter(s) (100 mL/Hr) IV Continuous <Continuous>  dextrose 5%. 1000 milliLiter(s) (50 mL/Hr) IV Continuous <Continuous>  dextrose 50% Injectable 25 Gram(s) IV Push once  dextrose 50% Injectable 12.5 Gram(s) IV Push once  dextrose 50% Injectable 25 Gram(s) IV Push once  dextrose Oral Gel 15 Gram(s) Oral once  finasteride 5 milliGRAM(s) Oral daily  glucagon  Injectable 1 milliGRAM(s) IntraMuscular once  insulin glargine Injectable (LANTUS) 12 Unit(s) SubCutaneous at bedtime  insulin lispro (ADMELOG) corrective regimen sliding scale   SubCutaneous at bedtime  insulin lispro (ADMELOG) corrective regimen sliding scale   SubCutaneous three times a day before meals  latanoprost 0.005% Ophthalmic Solution 1 Drop(s) Both EYES at bedtime  lidocaine   4% Patch 1 Patch Transdermal daily  lisinopril 40 milliGRAM(s) Oral daily  multivitamin 1 Tablet(s) Oral daily  pantoprazole  Injectable 40 milliGRAM(s) IV Push daily  polyethylene glycol 3350 17 Gram(s) Oral daily  risperiDONE   Tablet 0.5 milliGRAM(s) Oral daily  thiamine 100 milliGRAM(s) Oral daily

## 2025-07-11 NOTE — PROGRESS NOTE ADULT - PROBLEM SELECTOR PLAN 6
- ECG with sinus rhythm and no evidence of ischemia/infarction  - Check echo  - Monitor on telemetry  - Cardiac enzymes negative  - CTA head and neck with hygroma vs. 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 no SDH), start aspirin 81 mg daily as per cardiology recommendation , D/w Dr Matos and clearance requested for MICHELLE
- ECG with sinus rhythm and no evidence of ischemia/infarction  - Check echo  - Monitor on telemetry  - Cardiac enzymes negative  - CTA head and neck with hygroma vs. 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 no SDH), start aspirin 81 mg daily as per cardiology recommendation , D/w Dr Matos and clearance requested for MICHELLE
hold bp rx , cardiology and neurology cons requested

## 2025-07-11 NOTE — CASE MANAGEMENT PROGRESS NOTE - NSCMPROGRESSNOTE_GEN_ALL_CORE
TRANSITION OF CARE PLAN UPDATE  Telephone call to daughter Aide Marr [phone] to review DC planning; Aide stated she want to  patient  tonight 07/11/2025; 9:00pm; family will assume care for patient at DC; As per Attending MD; patient's lantus is due at 9pm and patient going home tonight will not be a good idea;  Attending MD will call Aide to explain that Saturday 07/12/2025 will be a better day for DC, Referral sent to [facility] for consideration, pending acceptance;

## 2025-07-11 NOTE — PROGRESS NOTE ADULT - PROBLEM SELECTOR PLAN 8
high dose statin as per tele-stroke consult recommendation
continue home medications
continue home medications

## 2025-07-11 NOTE — PROGRESS NOTE ADULT - PROBLEM SELECTOR PROBLEM 1
Hypoglycemia
Acute metabolic encephalopathy
Acute metabolic encephalopathy
DM (diabetes mellitus), type 2
Acute metabolic encephalopathy

## 2025-07-11 NOTE — PROGRESS NOTE ADULT - SUBJECTIVE AND OBJECTIVE BOX
CHIEF COMPLAINT/ REASON FOR VISIT  .. Patient was seen to address the  issue listed under PROBLEM LIST which is located toward bottom of this note     [facility]    Allergies    Allergy Status Unknown    Intolerances        PAST MEDICAL & SURGICAL HISTORY:  DM (diabetes mellitus), type 2      HTN (hypertension)      HLD (hyperlipidemia)      Dementia      GERD (gastroesophageal reflux disease)      BPH (benign prostatic hyperplasia)      Anxiety      Glaucoma      DM (diabetes mellitus), type 2          FAMILY HISTORY:      Home Medications:  acetaminophen 500 mg oral tablet: 1 tab(s) orally once a day (08 Jul 2025 20:44)  alfuzosin 10 mg oral tablet, extended release: 1 tab(s) orally once a day (at bedtime) (08 Jul 2025 20:44)  Aspercreme Arthritis Pain 1% topical gel: Apply topically to affected area once a day apply to lower back (08 Jul 2025 20:44)  atorvastatin 80 mg oral tablet: 1 tab(s) orally once a day (at bedtime) (10 Jul 2025 09:09)  Basaglar KwikPen 100 units/mL subcutaneous solution: 12 unit(s) subcutaneous once a day (08 Jul 2025 20:44)  Combigan 0.2%-0.5% ophthalmic solution: 1 drop(s) in each affected eye 2 times a day each eye (08 Jul 2025 20:44)  finasteride 5 mg oral tablet: 1 tab(s) orally once a day (08 Jul 2025 20:44)  lisinopril 10 mg oral tablet: 1 tab(s) orally once a day (08 Jul 2025 20:44)  Lumigan 0.01% ophthalmic solution: 1 drop(s) in each affected eye once a day (at bedtime) each eye (08 Jul 2025 20:46)  metFORMIN 500 mg oral tablet: 1 tab(s) orally once a day (08 Jul 2025 20:44)  Multiple Vitamins oral tablet: 1 tab(s) orally once a day (10 Jul 2025 09:09)  NovoLOG 100 units/mL subcutaneous solution: 4 unit(s) subcutaneous 3 times a day (with meals) (08 Jul 2025 20:44)  pantoprazole 40 mg oral delayed release tablet: 1 tab(s) orally once a day (in the morning) (08 Jul 2025 20:44)  polyethylene glycol 3350 oral powder for reconstitution: 17 gram(s) orally once a day (10 Jul 2025 09:09)  Rhopressa 0.02% ophthalmic solution: 1 drop(s) in each eye 3 times a day each eye (08 Jul 2025 20:44)  risperiDONE 0.5 mg oral tablet: 1 tab(s) orally once a day (08 Jul 2025 20:44)  thiamine 100 mg oral tablet: 1 tab(s) orally once a day (10 Jul 2025 09:09)      MEDICATIONS  (STANDING):  amLODIPine   Tablet 5 milliGRAM(s) Oral daily  atorvastatin 80 milliGRAM(s) Oral at bedtime  dextrose 5%. 1000 milliLiter(s) (100 mL/Hr) IV Continuous <Continuous>  dextrose 5%. 1000 milliLiter(s) (50 mL/Hr) IV Continuous <Continuous>  dextrose 50% Injectable 25 Gram(s) IV Push once  dextrose 50% Injectable 12.5 Gram(s) IV Push once  dextrose 50% Injectable 25 Gram(s) IV Push once  dextrose Oral Gel 15 Gram(s) Oral once  finasteride 5 milliGRAM(s) Oral daily  glucagon  Injectable 1 milliGRAM(s) IntraMuscular once  insulin glargine Injectable (LANTUS) 10 Unit(s) SubCutaneous at bedtime  insulin lispro (ADMELOG) corrective regimen sliding scale   SubCutaneous three times a day before meals  insulin lispro (ADMELOG) corrective regimen sliding scale   SubCutaneous at bedtime  latanoprost 0.005% Ophthalmic Solution 1 Drop(s) Both EYES at bedtime  lidocaine   4% Patch 1 Patch Transdermal daily  lisinopril 40 milliGRAM(s) Oral daily  multivitamin 1 Tablet(s) Oral daily  pantoprazole  Injectable 40 milliGRAM(s) IV Push daily  polyethylene glycol 3350 17 Gram(s) Oral daily  risperiDONE   Tablet 0.5 milliGRAM(s) Oral daily  thiamine 100 milliGRAM(s) Oral daily    MEDICATIONS  (PRN):  acetaminophen     Tablet .. 650 milliGRAM(s) Oral every 6 hours PRN Temp greater or equal to 38C (100.4F), Mild Pain (1 - 3)  acetaminophen   IVPB .. 1000 milliGRAM(s) IV Intermittent once PRN Temp greater or equal to 38.5C (101.3F), Moderate Pain (4 - 6)  aluminum hydroxide/magnesium hydroxide/simethicone Suspension 30 milliLiter(s) Oral every 4 hours PRN Dyspepsia  bisacodyl Suppository 10 milliGRAM(s) Rectal daily PRN Constipation  haloperidol    Injectable 0.5 milliGRAM(s) IntraMuscular every 6 hours PRN Agitation  melatonin 3 milliGRAM(s) Oral at bedtime PRN Insomnia  ondansetron Injectable 4 milliGRAM(s) IV Push every 8 hours PRN Nausea and/or Vomiting      Diet, Pureed:   Consistent Carbohydrate Evening Snack (07-09-25 @ 11:12) [Active]          Vital Signs Last 24 Hrs  T(C): 36.7 (11 Jul 2025 05:13), Max: 37.4 (10 Jul 2025 16:29)  T(F): 98 (11 Jul 2025 05:13), Max: 99.4 (10 Jul 2025 16:29)  HR: 76 (11 Jul 2025 05:13) (76 - 106)  BP: 177/91 (11 Jul 2025 05:13) (104/91 - 177/91)  BP(mean): 94 (10 Jul 2025 18:09) (72 - 124)  RR: 20 (11 Jul 2025 05:13) (14 - 20)  SpO2: 97% (11 Jul 2025 05:13) (97% - 100%)    Parameters below as of 11 Jul 2025 05:13  Patient On (Oxygen Delivery Method): room air          07-09-25 @ 07:01  -  07-10-25 @ 07:00  --------------------------------------------------------  IN: 100 mL / OUT: 0 mL / NET: 100 mL    07-10-25 @ 07:01  -  07-11-25 @ 06:18  --------------------------------------------------------  IN: 0 mL / OUT: 600 mL / NET: -600 mL              LABS:                        13.9   3.97  )-----------( 219      ( 10 Jul 2025 06:00 )             41.9     07-10    139  |  105  |  12  ----------------------------<  189[H]  5.0   |  25  |  0.80    Ca    9.0      10 Jul 2025 06:00        Urinalysis Basic - ( 10 Jul 2025 06:00 )    Color: x / Appearance: x / SG: x / pH: x  Gluc: 189 mg/dL / Ketone: x  / Bili: x / Urobili: x   Blood: x / Protein: x / Nitrite: x   Leuk Esterase: x / RBC: x / WBC x   Sq Epi: x / Non Sq Epi: x / Bacteria: x            WBC:  WBC Count: 3.97 K/uL (07-10 @ 06:00)  WBC Count: 7.00 K/uL (07-09 @ 05:30)  WBC Count: 3.40 K/uL (07-08 @ 18:16)      MICROBIOLOGY:  RECENT CULTURES:                  Sodium:  Sodium: 139 mmol/L (07-10 @ 06:00)  Sodium: 140 mmol/L (07-09 @ 05:30)  Sodium: 141 mmol/L (07-08 @ 18:16)      0.80 mg/dL 07-10 @ 06:00  0.95 mg/dL 07-09 @ 05:30  1.12 mg/dL 07-08 @ 18:16      Hemoglobin:  Hemoglobin: 13.9 g/dL (07-10 @ 06:00)  Hemoglobin: 12.8 g/dL (07-09 @ 05:30)  Hemoglobin: 12.3 g/dL (07-08 @ 18:16)      Platelets: Platelet Count - Automated: 219 K/uL (07-10 @ 06:00)  Platelet Count - Automated: 178 K/uL (07-09 @ 05:30)  Platelet Count - Automated: 238 K/uL (07-08 @ 18:16)          Urinalysis Basic - ( 10 Jul 2025 06:00 )    Color: x / Appearance: x / SG: x / pH: x  Gluc: 189 mg/dL / Ketone: x  / Bili: x / Urobili: x   Blood: x / Protein: x / Nitrite: x   Leuk Esterase: x / RBC: x / WBC x   Sq Epi: x / Non Sq Epi: x / Bacteria: x        RADIOLOGY & ADDITIONAL STUDIES:      MICROBIOLOGY:  RECENT CULTURES:

## 2025-07-12 VITALS
RESPIRATION RATE: 18 BRPM | HEART RATE: 78 BPM | TEMPERATURE: 98 F | DIASTOLIC BLOOD PRESSURE: 70 MMHG | OXYGEN SATURATION: 99 % | SYSTOLIC BLOOD PRESSURE: 125 MMHG

## 2025-07-12 LAB
ANION GAP SERPL CALC-SCNC: 7 MMOL/L — SIGNIFICANT CHANGE UP (ref 5–17)
BUN SERPL-MCNC: 16 MG/DL — SIGNIFICANT CHANGE UP (ref 7–23)
CALCIUM SERPL-MCNC: 9.4 MG/DL — SIGNIFICANT CHANGE UP (ref 8.4–10.5)
CHLORIDE SERPL-SCNC: 105 MMOL/L — SIGNIFICANT CHANGE UP (ref 96–108)
CO2 SERPL-SCNC: 30 MMOL/L — SIGNIFICANT CHANGE UP (ref 22–31)
CREAT SERPL-MCNC: 1.01 MG/DL — SIGNIFICANT CHANGE UP (ref 0.5–1.3)
EGFR: 77 ML/MIN/1.73M2 — SIGNIFICANT CHANGE UP
EGFR: 77 ML/MIN/1.73M2 — SIGNIFICANT CHANGE UP
GLUCOSE BLDC GLUCOMTR-MCNC: 184 MG/DL — HIGH (ref 70–99)
GLUCOSE SERPL-MCNC: 140 MG/DL — HIGH (ref 70–99)
HCT VFR BLD CALC: 41.1 % — SIGNIFICANT CHANGE UP (ref 39–50)
HGB BLD-MCNC: 13.2 G/DL — SIGNIFICANT CHANGE UP (ref 13–17)
MCHC RBC-ENTMCNC: 30.1 PG — SIGNIFICANT CHANGE UP (ref 27–34)
MCHC RBC-ENTMCNC: 32.1 G/DL — SIGNIFICANT CHANGE UP (ref 32–36)
MCV RBC AUTO: 93.8 FL — SIGNIFICANT CHANGE UP (ref 80–100)
NRBC # BLD AUTO: 0 K/UL — SIGNIFICANT CHANGE UP (ref 0–0)
NRBC # FLD: 0 K/UL — SIGNIFICANT CHANGE UP (ref 0–0)
NRBC BLD AUTO-RTO: 0 /100 WBCS — SIGNIFICANT CHANGE UP (ref 0–0)
PLATELET # BLD AUTO: 266 K/UL — SIGNIFICANT CHANGE UP (ref 150–400)
PMV BLD: 8.9 FL — SIGNIFICANT CHANGE UP (ref 7–13)
POTASSIUM SERPL-MCNC: 4.6 MMOL/L — SIGNIFICANT CHANGE UP (ref 3.5–5.3)
POTASSIUM SERPL-SCNC: 4.6 MMOL/L — SIGNIFICANT CHANGE UP (ref 3.5–5.3)
RBC # BLD: 4.38 M/UL — SIGNIFICANT CHANGE UP (ref 4.2–5.8)
RBC # FLD: 13.2 % — SIGNIFICANT CHANGE UP (ref 10.3–14.5)
SODIUM SERPL-SCNC: 142 MMOL/L — SIGNIFICANT CHANGE UP (ref 135–145)
WBC # BLD: 3.68 K/UL — LOW (ref 3.8–10.5)
WBC # FLD AUTO: 3.68 K/UL — LOW (ref 3.8–10.5)

## 2025-07-12 PROCEDURE — 84484 ASSAY OF TROPONIN QUANT: CPT

## 2025-07-12 PROCEDURE — 70450 CT HEAD/BRAIN W/O DYE: CPT

## 2025-07-12 PROCEDURE — 71045 X-RAY EXAM CHEST 1 VIEW: CPT

## 2025-07-12 PROCEDURE — 82962 GLUCOSE BLOOD TEST: CPT

## 2025-07-12 PROCEDURE — 82248 BILIRUBIN DIRECT: CPT

## 2025-07-12 PROCEDURE — 85025 COMPLETE CBC W/AUTO DIFF WBC: CPT

## 2025-07-12 PROCEDURE — 85610 PROTHROMBIN TIME: CPT

## 2025-07-12 PROCEDURE — 97116 GAIT TRAINING THERAPY: CPT

## 2025-07-12 PROCEDURE — 80061 LIPID PANEL: CPT

## 2025-07-12 PROCEDURE — 97161 PT EVAL LOW COMPLEX 20 MIN: CPT

## 2025-07-12 PROCEDURE — 85730 THROMBOPLASTIN TIME PARTIAL: CPT

## 2025-07-12 PROCEDURE — 82746 ASSAY OF FOLIC ACID SERUM: CPT

## 2025-07-12 PROCEDURE — 80048 BASIC METABOLIC PNL TOTAL CA: CPT

## 2025-07-12 PROCEDURE — 70496 CT ANGIOGRAPHY HEAD: CPT

## 2025-07-12 PROCEDURE — 85027 COMPLETE CBC AUTOMATED: CPT

## 2025-07-12 PROCEDURE — 36415 COLL VENOUS BLD VENIPUNCTURE: CPT

## 2025-07-12 PROCEDURE — 70498 CT ANGIOGRAPHY NECK: CPT

## 2025-07-12 PROCEDURE — 87640 STAPH A DNA AMP PROBE: CPT

## 2025-07-12 PROCEDURE — 84443 ASSAY THYROID STIM HORMONE: CPT

## 2025-07-12 PROCEDURE — 92610 EVALUATE SWALLOWING FUNCTION: CPT

## 2025-07-12 PROCEDURE — 83036 HEMOGLOBIN GLYCOSYLATED A1C: CPT

## 2025-07-12 PROCEDURE — 93005 ELECTROCARDIOGRAM TRACING: CPT

## 2025-07-12 PROCEDURE — 82607 VITAMIN B-12: CPT

## 2025-07-12 PROCEDURE — 87641 MR-STAPH DNA AMP PROBE: CPT

## 2025-07-12 PROCEDURE — 80053 COMPREHEN METABOLIC PANEL: CPT

## 2025-07-12 PROCEDURE — 93356 MYOCRD STRAIN IMG SPCKL TRCK: CPT

## 2025-07-12 PROCEDURE — 97530 THERAPEUTIC ACTIVITIES: CPT

## 2025-07-12 PROCEDURE — 99285 EMERGENCY DEPT VISIT HI MDM: CPT | Mod: 25

## 2025-07-12 PROCEDURE — 93306 TTE W/DOPPLER COMPLETE: CPT

## 2025-07-12 RX ORDER — AMLODIPINE BESYLATE 10 MG/1
1 TABLET ORAL
Qty: 14 | Refills: 0
Start: 2025-07-12 | End: 2025-07-25

## 2025-07-12 RX ORDER — METFORMIN HYDROCHLORIDE 850 MG/1
1 TABLET ORAL
Refills: 0 | DISCHARGE

## 2025-07-12 RX ORDER — ATORVASTATIN CALCIUM 80 MG/1
1 TABLET, FILM COATED ORAL
Qty: 14 | Refills: 0
Start: 2025-07-12 | End: 2025-07-25

## 2025-07-12 RX ORDER — METFORMIN HYDROCHLORIDE 850 MG/1
1 TABLET ORAL
Qty: 28 | Refills: 0
Start: 2025-07-12 | End: 2025-07-25

## 2025-07-12 RX ORDER — ACETAMINOPHEN 500 MG/5ML
1 LIQUID (ML) ORAL
Refills: 0 | DISCHARGE

## 2025-07-12 RX ORDER — INSULIN ASPART 100 [IU]/ML
4 INJECTION, SOLUTION INTRAVENOUS; SUBCUTANEOUS
Refills: 0 | DISCHARGE

## 2025-07-12 RX ORDER — LISINOPRIL 5 MG/1
1 TABLET ORAL
Qty: 14 | Refills: 0
Start: 2025-07-12 | End: 2025-07-25

## 2025-07-12 RX ADMIN — INSULIN LISPRO 2: 100 INJECTION, SOLUTION INTRAVENOUS; SUBCUTANEOUS at 08:05

## 2025-07-12 RX ADMIN — LISINOPRIL 40 MILLIGRAM(S): 5 TABLET ORAL at 06:07

## 2025-07-12 RX ADMIN — AMLODIPINE BESYLATE 5 MILLIGRAM(S): 10 TABLET ORAL at 06:07

## 2025-07-12 NOTE — PROGRESS NOTE ADULT - SUBJECTIVE AND OBJECTIVE BOX
Chief Complaint: AMS    Interval Events: No events overnight.    Review of Systems:  General: No fevers, chills, weight gain  Skin: No rashes, color changes  Cardiovascular: No chest pain, orthopnea  Respiratory: No shortness of breath, cough  Gastrointestinal: No nausea, abdominal pain  Genitourinary: No incontinence, pain with urination  Musculoskeletal: No pain, swelling, decreased range of motion  Neurological: No headache, weakness  Psychiatric: No depression, anxiety  Endocrine: No weight gain, increased thirst  All other systems are comprehensively negative.    Physical Exam:  Vital Signs Last 24 Hrs  T(C): 36.9 (12 Jul 2025 06:00), Max: 37.3 (11 Jul 2025 20:35)  T(F): 98.5 (12 Jul 2025 06:00), Max: 99.1 (11 Jul 2025 20:35)  HR: 72 (12 Jul 2025 06:00) (72 - 93)  BP: 149/85 (12 Jul 2025 06:00) (102/78 - 158/100)  BP(mean): 87 (11 Jul 2025 20:35) (82 - 87)  RR: 18 (12 Jul 2025 06:00) (17 - 20)  SpO2: 99% (12 Jul 2025 06:00) (95% - 100%)    Parameters below as of 12 Jul 2025 06:00  Patient On (Oxygen Delivery Method): room air      General: NAD  HEENT: MMM  Neck: No JVD, no carotid bruit  Lungs: CTAB  CV: RRR, nl S1/S2, no M/R/G  Abdomen: S/NT/ND, +BS  Extremities: No LE edema, no cyanosis  Neuro: AAOx1  Skin: No rash    Labs:             07-12    142  |  105  |  16  ----------------------------<  140[H]  4.6   |  30  |  1.01    Ca    9.4      12 Jul 2025 06:00                          13.2   3.68  )-----------( 266      ( 12 Jul 2025 06:00 )             41.1       ECG/Telemetry: Sinus rhythm

## 2025-07-12 NOTE — PROGRESS NOTE ADULT - SUBJECTIVE AND OBJECTIVE BOX
PROGRESS NOTE  Patient is a 74y old  Male who presents with a chief complaint of Altered mental status    Per HPI: Patient is 75 yo AAM who was brought in by EMS from [facility] for altered mental status.  Per EMS report patient's last known well was just prior to 5 PM when patient was next seen he was unresponsive.  EMS relates staff at [facility] found the patient to be hypoglycemic with a blood sugar of 49 they gave 2 doses of glucagon and oral glucose paste and blood glucose improved to 71 upon EMS arrival.  EMS reports they were told patient has dementia his baseline is awake and confused which is how the patient presented with EMS.  Patient poor historian secondary to dementia unsure why in ER denies any complaints.  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  - Not a candidate for mechanical thrombectomy w/ distal right vertebral artery occlusion- non amendable to thrombectomy.  - Follow up HgbA1c/ lipid profile - start HD atrovastatin daily   - Bedside dysphagia screen /PT / OT evaluation  - MRI brain w/o contrast  - TTE; r/o cardioembolic event /PFO  - Maintain permissive HTN for 24 hours  (08 Jul 2025 20:54) (09 Jul 2025 10:01)    Chart and available morning labs /imaging are reviewed electronically , urgent issues addressed . More information  is being added upon completion of rounds , when more information is collected and management discussed with consultants , medical staff and social service/case management on the floor   OVERNIGHT    No new issues reported by medical staff . All above noted Patient is resting in a bed comfortably Daughter LE IS AT BEDSIDE , D/C RX REVIEWED  .No distress noted   HPI:  Patient is 75 yo AAM who was brought in by EMS from [facility] for altered mental status.  Per EMS report patient's last known well was just prior to 5 PM when patient was next seen he was unresponsive.  EMS relates staff at [facility] found the patient to be hypoglycemic with a blood sugar of 49 they gave 2 doses of glucagon and oral glucose paste and blood glucose improved to 71 upon EMS arrival.  EMS reports they were told patient has dementia his baseline is awake and confused which is how the patient presented with EMS.  Patient poor historian secondary to dementia unsure why in ER denies any complaints.  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  - Not a candidate for mechanical thrombectomy w/ distal right vertebral artery occlusion- non amendable to thrombectomy.  - Follow up HgbA1c/ lipid profile - start HD atrovastatin daily   - Bedside dysphagia screen /PT / OT evaluation  - MRI brain w/o contrast  - TTE; r/o cardioembolic event /PFO  - Maintain permissive HTN for 24 hours  (08 Jul 2025 20:54)    PAST MEDICAL & SURGICAL HISTORY:  DM (diabetes mellitus), type 2      HTN (hypertension)      HLD (hyperlipidemia)      Dementia      GERD (gastroesophageal reflux disease)      BPH (benign prostatic hyperplasia)      Anxiety      Glaucoma      DM (diabetes mellitus), type 2          MEDICATIONS  (STANDING):  amLODIPine   Tablet 5 milliGRAM(s) Oral daily  atorvastatin 80 milliGRAM(s) Oral at bedtime  dextrose 5%. 1000 milliLiter(s) (100 mL/Hr) IV Continuous <Continuous>  dextrose 5%. 1000 milliLiter(s) (50 mL/Hr) IV Continuous <Continuous>  dextrose 50% Injectable 25 Gram(s) IV Push once  dextrose 50% Injectable 12.5 Gram(s) IV Push once  dextrose 50% Injectable 25 Gram(s) IV Push once  dextrose Oral Gel 15 Gram(s) Oral once  finasteride 5 milliGRAM(s) Oral daily  glucagon  Injectable 1 milliGRAM(s) IntraMuscular once  insulin glargine Injectable (LANTUS) 12 Unit(s) SubCutaneous at bedtime  insulin lispro (ADMELOG) corrective regimen sliding scale   SubCutaneous at bedtime  insulin lispro (ADMELOG) corrective regimen sliding scale   SubCutaneous three times a day before meals  latanoprost 0.005% Ophthalmic Solution 1 Drop(s) Both EYES at bedtime  lidocaine   4% Patch 1 Patch Transdermal daily  lisinopril 40 milliGRAM(s) Oral daily  multivitamin 1 Tablet(s) Oral daily  pantoprazole  Injectable 40 milliGRAM(s) IV Push daily  polyethylene glycol 3350 17 Gram(s) Oral daily  risperiDONE   Tablet 0.5 milliGRAM(s) Oral daily  thiamine 100 milliGRAM(s) Oral daily    MEDICATIONS  (PRN):  acetaminophen     Tablet .. 650 milliGRAM(s) Oral every 6 hours PRN Temp greater or equal to 38C (100.4F), Mild Pain (1 - 3)  acetaminophen   IVPB .. 1000 milliGRAM(s) IV Intermittent once PRN Temp greater or equal to 38.5C (101.3F), Moderate Pain (4 - 6)  aluminum hydroxide/magnesium hydroxide/simethicone Suspension 30 milliLiter(s) Oral every 4 hours PRN Dyspepsia  bisacodyl Suppository 10 milliGRAM(s) Rectal daily PRN Constipation  haloperidol    Injectable 0.5 milliGRAM(s) IntraMuscular every 6 hours PRN Agitation  melatonin 3 milliGRAM(s) Oral at bedtime PRN Insomnia  ondansetron Injectable 4 milliGRAM(s) IV Push every 8 hours PRN Nausea and/or Vomiting      OBJECTIVE    T(C): 36.7 (07-12-25 @ 08:00), Max: 37.3 (07-11-25 @ 20:35)  HR: 78 (07-12-25 @ 08:00) (72 - 93)  BP: 125/70 (07-12-25 @ 08:00) (102/78 - 158/100)  RR: 18 (07-12-25 @ 08:00) (17 - 20)  SpO2: 99% (07-12-25 @ 08:00) (95% - 100%)  Wt(kg): --  I&O's Summary    11 Jul 2025 07:01  -  12 Jul 2025 07:00  --------------------------------------------------------  IN: 1050 mL / OUT: 1050 mL / NET: 0 mL          REVIEW OF SYSTEMS:  CONSTITUTIONAL: No fever, weight loss, or fatigue  EYES: No eye pain, visual disturbances, or discharge  ENMT:   No sinus or throat pain  NECK: No pain or stiffness  RESPIRATORY: No cough, wheezing, chills or hemoptysis; No shortness of breath  CARDIOVASCULAR: No chest pain, palpitations, dizziness, or leg swelling  GASTROINTESTINAL: No abdominal pain. No nausea, vomiting; No diarrhea or constipation. No melena or hematochezia.  GENITOURINARY: No dysuria, frequency, hematuria, or incontinence  NEUROLOGICAL: No headaches, memory loss, loss of strength, numbness, or tremors  SKIN: No itching, burning, rashes, or lesions   MUSCULOSKELETAL: No joint pain or swelling; No muscle, back, or extremity pain    PHYSICAL EXAM:  Appearance: NAD. VS past 24 hrs -as above   HEENT:   Moist oral mucosa. Conjunctiva clear b/l.   Neck : supple  Respiratory: Lungs CTAB.  Gastrointestinal:  Soft, nontender. No rebound. No rigidity. BS present	  Cardiovascular: RRR ,S1S2 present  Neurologic: Non-focal. Moving all extremities.  Extremities: No edema. No erythema. No calf tenderness.  Skin: No rashes, No ecchymoses, No cyanosis.	  wounds ,skin lesions-See skin assesment flow sheet   LABS:                        13.2   3.68  )-----------( 266      ( 12 Jul 2025 06:00 )             41.1     07-12    142  |  105  |  16  ----------------------------<  140[H]  4.6   |  30  |  1.01    Ca    9.4      12 Jul 2025 06:00      CAPILLARY BLOOD GLUCOSE      POCT Blood Glucose.: 184 mg/dL (12 Jul 2025 07:46)  POCT Blood Glucose.: 228 mg/dL (11 Jul 2025 22:18)  POCT Blood Glucose.: 140 mg/dL (11 Jul 2025 16:57)  POCT Blood Glucose.: 212 mg/dL (11 Jul 2025 12:44)      Urinalysis Basic - ( 12 Jul 2025 06:00 )    Color: x / Appearance: x / SG: x / pH: x  Gluc: 140 mg/dL / Ketone: x  / Bili: x / Urobili: x   Blood: x / Protein: x / Nitrite: x   Leuk Esterase: x / RBC: x / WBC x   Sq Epi: x / Non Sq Epi: x / Bacteria: x        RADIOLOGY & ADDITIONAL TESTS:   reviewed elctronically  ASSESSMENT/PLAN: 	    Patient was seen and examined on a day of discharge . Plan of care , discharge medications and recommendations discussed with consultants and clearance for discharge obtained .Social service , case management  and medical staff are aware of plan. Family is notified. Discharge summary  is  prepared electronically-see separate document prepared by me .55minutes spent on this visit, 50% visit time spent in care co-ordination with other attendings and counselling patient  I have discussed care plan with patient and HCP,expressed understanding of problems treatment and their effect and side effects, alternatives in detail,I have asked if they have any questions and concerns and appropriately addressed them to best of my ability

## 2025-07-12 NOTE — CASE MANAGEMENT PROGRESS NOTE - NSCMPROGRESSNOTE_GEN_ALL_CORE
FINAL DISCHARGE NOTE:  Met with patient and daughter DARIA HOUSTON [phone] at bedside; family is at bedside ready to take patient home; Accepted for home care services by [facility]: SERVICE ADDRESS:[address] [place] [address]; Discussed and explained IMM and patient's appeal rights,  all parties stated they are in agreement with discharge to home wit home care services and does not wish to appeal DC;

## 2025-07-12 NOTE — PROGRESS NOTE ADULT - SUBJECTIVE AND OBJECTIVE BOX
CHIEF COMPLAINT/ REASON FOR VISIT  .. Patient was seen to address the  issue listed under PROBLEM LIST which is located toward bottom of this note     [facility]    Allergies    Allergy Status Unknown    Intolerances        PAST MEDICAL & SURGICAL HISTORY:  DM (diabetes mellitus), type 2      HTN (hypertension)      HLD (hyperlipidemia)      Dementia      GERD (gastroesophageal reflux disease)      BPH (benign prostatic hyperplasia)      Anxiety      Glaucoma      DM (diabetes mellitus), type 2          FAMILY HISTORY:      Home Medications:  acetaminophen 325 mg oral tablet: 2 tab(s) orally every 6 hours As needed Temp greater or equal to 38C (100.4F), Mild Pain (1 - 3) (11 Jul 2025 16:46)  alfuzosin 10 mg oral tablet, extended release: 1 tab(s) orally once a day (at bedtime) (08 Jul 2025 20:44)  Aspercreme Arthritis Pain 1% topical gel: Apply topically to affected area once a day apply to lower back (08 Jul 2025 20:44)  Combigan 0.2%-0.5% ophthalmic solution: 1 drop(s) in each affected eye 2 times a day each eye (08 Jul 2025 20:44)  finasteride 5 mg oral tablet: 1 tab(s) orally once a day (11 Jul 2025 16:46)  insulin glargine 100 units/mL subcutaneous solution: 12 unit(s) subcutaneous once a day (at bedtime) (11 Jul 2025 16:46)  Lumigan 0.01% ophthalmic solution: 1 drop(s) in each affected eye once a day (at bedtime) each eye (08 Jul 2025 20:46)  Multiple Vitamins oral tablet: 1 tab(s) orally once a day (10 Jul 2025 09:09)  pantoprazole 40 mg oral delayed release tablet: 1 tab(s) orally once a day (in the morning) (08 Jul 2025 20:44)  polyethylene glycol 3350 oral powder for reconstitution: 17 gram(s) orally once a day (10 Jul 2025 09:09)  Rhopressa 0.02% ophthalmic solution: 1 drop(s) in each eye 3 times a day each eye (08 Jul 2025 20:44)  risperiDONE 0.5 mg oral tablet: 1 tab(s) orally once a day (11 Jul 2025 16:46)  thiamine 100 mg oral tablet: 1 tab(s) orally once a day (10 Jul 2025 09:09)      MEDICATIONS  (STANDING):  amLODIPine   Tablet 5 milliGRAM(s) Oral daily  atorvastatin 80 milliGRAM(s) Oral at bedtime  dextrose 5%. 1000 milliLiter(s) (100 mL/Hr) IV Continuous <Continuous>  dextrose 5%. 1000 milliLiter(s) (50 mL/Hr) IV Continuous <Continuous>  dextrose 50% Injectable 25 Gram(s) IV Push once  dextrose 50% Injectable 12.5 Gram(s) IV Push once  dextrose 50% Injectable 25 Gram(s) IV Push once  dextrose Oral Gel 15 Gram(s) Oral once  finasteride 5 milliGRAM(s) Oral daily  glucagon  Injectable 1 milliGRAM(s) IntraMuscular once  insulin glargine Injectable (LANTUS) 12 Unit(s) SubCutaneous at bedtime  insulin lispro (ADMELOG) corrective regimen sliding scale   SubCutaneous at bedtime  insulin lispro (ADMELOG) corrective regimen sliding scale   SubCutaneous three times a day before meals  latanoprost 0.005% Ophthalmic Solution 1 Drop(s) Both EYES at bedtime  lidocaine   4% Patch 1 Patch Transdermal daily  lisinopril 40 milliGRAM(s) Oral daily  multivitamin 1 Tablet(s) Oral daily  pantoprazole  Injectable 40 milliGRAM(s) IV Push daily  polyethylene glycol 3350 17 Gram(s) Oral daily  risperiDONE   Tablet 0.5 milliGRAM(s) Oral daily  thiamine 100 milliGRAM(s) Oral daily    MEDICATIONS  (PRN):  acetaminophen     Tablet .. 650 milliGRAM(s) Oral every 6 hours PRN Temp greater or equal to 38C (100.4F), Mild Pain (1 - 3)  acetaminophen   IVPB .. 1000 milliGRAM(s) IV Intermittent once PRN Temp greater or equal to 38.5C (101.3F), Moderate Pain (4 - 6)  aluminum hydroxide/magnesium hydroxide/simethicone Suspension 30 milliLiter(s) Oral every 4 hours PRN Dyspepsia  bisacodyl Suppository 10 milliGRAM(s) Rectal daily PRN Constipation  haloperidol    Injectable 0.5 milliGRAM(s) IntraMuscular every 6 hours PRN Agitation  melatonin 3 milliGRAM(s) Oral at bedtime PRN Insomnia  ondansetron Injectable 4 milliGRAM(s) IV Push every 8 hours PRN Nausea and/or Vomiting      Diet, Pureed:   Consistent Carbohydrate Evening Snack (07-09-25 @ 11:12) [Active]          Vital Signs Last 24 Hrs  T(C): 36.9 (12 Jul 2025 06:00), Max: 37.3 (11 Jul 2025 20:35)  T(F): 98.5 (12 Jul 2025 06:00), Max: 99.1 (11 Jul 2025 20:35)  HR: 72 (12 Jul 2025 06:00) (72 - 93)  BP: 149/85 (12 Jul 2025 06:00) (102/78 - 158/100)  BP(mean): 87 (11 Jul 2025 20:35) (82 - 87)  RR: 18 (12 Jul 2025 06:00) (17 - 20)  SpO2: 99% (12 Jul 2025 06:00) (95% - 100%)    Parameters below as of 12 Jul 2025 06:00  Patient On (Oxygen Delivery Method): room air          07-11-25 @ 07:01  -  07-12-25 @ 07:00  --------------------------------------------------------  IN: 1050 mL / OUT: 1050 mL / NET: 0 mL              LABS:                        13.2   3.68  )-----------( 266      ( 12 Jul 2025 06:00 )             41.1     07-12    142  |  105  |  16  ----------------------------<  140[H]  4.6   |  30  |  1.01    Ca    9.4      12 Jul 2025 06:00        Urinalysis Basic - ( 12 Jul 2025 06:00 )    Color: x / Appearance: x / SG: x / pH: x  Gluc: 140 mg/dL / Ketone: x  / Bili: x / Urobili: x   Blood: x / Protein: x / Nitrite: x   Leuk Esterase: x / RBC: x / WBC x   Sq Epi: x / Non Sq Epi: x / Bacteria: x            WBC:  WBC Count: 3.68 K/uL (07-12 @ 06:00)  WBC Count: 3.97 K/uL (07-10 @ 06:00)  WBC Count: 7.00 K/uL (07-09 @ 05:30)  WBC Count: 3.40 K/uL (07-08 @ 18:16)      MICROBIOLOGY:  RECENT CULTURES:                  Sodium:  Sodium: 142 mmol/L (07-12 @ 06:00)  Sodium: 139 mmol/L (07-10 @ 06:00)  Sodium: 140 mmol/L (07-09 @ 05:30)  Sodium: 141 mmol/L (07-08 @ 18:16)      1.01 mg/dL 07-12 @ 06:00  0.80 mg/dL 07-10 @ 06:00  0.95 mg/dL 07-09 @ 05:30  1.12 mg/dL 07-08 @ 18:16      Hemoglobin:  Hemoglobin: 13.2 g/dL (07-12 @ 06:00)  Hemoglobin: 13.9 g/dL (07-10 @ 06:00)  Hemoglobin: 12.8 g/dL (07-09 @ 05:30)  Hemoglobin: 12.3 g/dL (07-08 @ 18:16)      Platelets: Platelet Count - Automated: 266 K/uL (07-12 @ 06:00)  Platelet Count - Automated: 219 K/uL (07-10 @ 06:00)  Platelet Count - Automated: 178 K/uL (07-09 @ 05:30)  Platelet Count - Automated: 238 K/uL (07-08 @ 18:16)          Urinalysis Basic - ( 12 Jul 2025 06:00 )    Color: x / Appearance: x / SG: x / pH: x  Gluc: 140 mg/dL / Ketone: x  / Bili: x / Urobili: x   Blood: x / Protein: x / Nitrite: x   Leuk Esterase: x / RBC: x / WBC x   Sq Epi: x / Non Sq Epi: x / Bacteria: x        RADIOLOGY & ADDITIONAL STUDIES:      MICROBIOLOGY:  RECENT CULTURES:

## 2025-07-12 NOTE — PROGRESS NOTE ADULT - PROVIDER SPECIALTY LIST ADULT
Cardiology
Pulmonology
Pulmonology
Cardiology
Diabetes
Hospitalist
Neurology
Pulmonology
Pulmonology
Cardiology
Endocrinology
Hospitalist

## 2025-07-12 NOTE — PROGRESS NOTE ADULT - ASSESSMENT
The patient is a 74 year old male with a history of HTN, HL, DM, BPH, dementia who presents with AMS.    Plan:  - ECG with sinus rhythm and no evidence of ischemia/infarction  - Echo with normal LV systolic function, no significant valve issues  - Monitor on telemetry  - Cardiac enzymes negative  - CTA head and neck with hygroma vs. 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
The patient is a 74 year old male with a history of HTN, HL, DM, BPH, dementia who presents with AMS.    Plan:  - ECG with sinus rhythm and no evidence of ischemia/infarction  - Echo with normal LV systolic function, no significant valve issues  - Monitor on telemetry  - Cardiac enzymes negative  - CTA head and neck with hygroma vs. 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
   REASON FOR VISIT  .. Management of problems listed below      CC.   . 7/8/2025 PT BIB EMS from [facility] c/o AMS; pt found unresponsive at facility at 1700 with glucose of 49; pt got glucagon x2 from facility; hx dementia; pt responsive upon arrival to ED ; answers to name     OVERALL PRESENTATION.  . 7/8/2025 74 M brought in by EMS from [facility] for altered mental status.  Per EMS report patient's last known well was just prior to 5 PM when patient was next seen he was unresponsive.  EMS relates staff at [facility] found the patient to be hypoglycemic with a blood sugar of 49 they gave 2 doses of glucagon and oral glucose paste and blood glucose improved to 71 upon EMS arrival.  EMS reports they were told patient has dementia his baseline is awake and confused which is how the patient presented with EMS.  Patient poor historian secondary to dementia unsure why in ER denies any complaints.  No further history available.  PMD Herminio Bird  . [facility].        VITALS/GAS EXCHANGE/DRIPS  ABGS.     .  VS/ PO/IO/ VENT/ DRIPS.   7/9/2025 afeb 69 170/80   7/9/2025 ra 99%     [id number]   SUMMARY BASELINE .     . 74 m baseline confused sent from [facility]   CC.   . 7/8/2025 HYPOGLYCEMIA 49   . 7/8/2025 UNRESPONSIVE BRIEFLY WHILE AT [facility] AROUND 5P   MAIN ISSUES.  . INFECTION DATA W 7/8/2025 w 3.4 cxr 7/8 napd   . CAD Tr 7/8/2025 Tr 25 7/8 ATORVASTAT 80   . HEMAT 7/8/2025 Hb 12.3 Plt 238     . RENAL DATA 7/8/2025 Na 141 K 3.6 CO2 28 Cr 1.1   . HYPOGLYCEMIA A/R 7/8/2025 Monitor   . CHRONIC R SUBDURAL HEMATOMA 7/8/2025 CTA HEAD   . VERTEBRAL R INTRADURAL ARTRY OCCLUSION 7/8 CTA   . ALTERED MENTAL STATE 7/8/2025 ct head 7/8/2025 chronic infarct left occipital lobe Small right holohemispheric subdural upto 0.9 cm in width hygroma vs old subdural hematoma Occlusion of quin distal most segment of right intradural vet=rtebtatl artery   . 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  - Not a candidate for mechanical thrombectomy w/ distal right vertebral artery occlusion- non amendable to thrombectomy.  - Follow up HgbA1c/ lipid profile - start HD atrovastatin daily   - Bedside dysphagia screen /PT / OT evaluation  - MRI brain w/o contrast  - TTE; r/o cardioembolic event /PFO  - Maintain permissive HTN for 24 hours   INFECTION MGMT.  . MICROBIO.   . ANTIBIO.  . SCV2.   GOC.  ....   BEST PRACTICE ISSUES.   . DIET.   .... 7/9/2025 puree   .... 7/8/2025 NPO except meds   . DYSPHAGIA EVAL.   . FREE WATER.   . IV FLUID.  .... 7/9/2025 off iv fl   .... 7/8/2025 D5 NS 50   . DVT PPLX. 7/8/2025 No pharmac pplx pending neuro eval (sybdural bleed)   . HOB ELEVATN.  Yes  . ASSESS NEED FOR HOME O2 AT DISCHARGE: Will need home O2 if ra rest/exertion po belo 88%   . INFECTION PPLX .   . STRESS ULCER PPLX. 7/8/2025 PROTONIX 40   ....   ICU STAY. None   PROCEDURES/DEVICES .  PMH.   .... Dementia     PATIENT DATA   . ALLERGY.nka  . WEIGHT. 7/8/2025 53  . BMI. 7/8/2025 18     DISCUSSIONS.  .... Discussed with primary care and relevant consultants on an ongoing basis       TIME SPENT.  . Over 36 minutes aggregate care time spent on encounter; activities included   direct patient care, counseling and/or coordinating care reviewing notes, lab data/ imaging , discussion with multidisciplinary team/ patient  /family and explaining in detail risks, benefits, alternatives  of the recommendations     ROSEMARIE DANIEL  74 m 7/8/2025   
   REASON FOR VISIT  .. Management of problems listed below      CC.   . 7/8/2025 PT BIB EMS from [facility] c/o AMS; pt found unresponsive at facility at 1700 with glucose of 49; pt got glucagon x2 from facility; hx dementia; pt responsive upon arrival to ED ; answers to name     OVERALL PRESENTATION.  . 7/8/2025 74 M brought in by EMS from [facility] for altered mental status.  Per EMS report patient's last known well was just prior to 5 PM when patient was next seen he was unresponsive.  EMS relates staff at [facility] found the patient to be hypoglycemic with a blood sugar of 49 they gave 2 doses of glucagon and oral glucose paste and blood glucose improved to 71 upon EMS arrival.  EMS reports they were told patient has dementia his baseline is awake and confused which is how the patient presented with EMS.  Patient poor historian secondary to dementia unsure why in ER denies any complaints.  No further history available.  PMD Herminio Bird  . [facility].        VITALS/GAS EXCHANGE/DRIPS  ABGS.     .  VS/ PO/IO/ VENT/ DRIPS.   7/10/2025 afeb 93 106/57  7/10/2025 ra 100%     [id number]   SUMMARY BASELINE .     . 74 m baseline confused sent from [facility]   CC.   . 7/8/2025 HYPOGLYCEMIA 49   . 7/8/2025 UNRESPONSIVE BRIEFLY WHILE AT [facility] AROUND 5P   MAIN ISSUES.  . INFECTION DATA W 7/8/2025 w 3.4 cxr 7/8 napd   . CAD Tr 7/8/2025 Tr 25 7/8 ATORVASTAT 80   . RO CHF tte 7/9 ef 64% normal la and ra RX 7/10/2025 LISINOPRIIL 40  . HEMAT 7/8/2025 Hb 12.3 Plt 238     . RENAL DATA 7/8/2025 Na 141 K 3.6 CO2 28 Cr 1.1   . HYPOGLYCEMIA BGL 7/10 182-259A/R 7/8/2025 Monitor   . DM 7/10/2025 LANTUS 10 HS 7/10/2025 SL SCALE   . CHRONIC R SUBDURAL HEMATOMA 7/8/2025 CTA HEAD   . VERTEBRAL R INTRADURAL ARTRY OCCLUSION 7/8 CTA   . ALTERED MENTAL STATE 7/8/2025 ct head 7/8/2025 chronic infarct left occipital lobe Small right holohemispheric subdural upto 0.9 cm in width hygroma vs old subdural hematoma Occlusion of quin distal most segment of right intradural vet=rtebtatl artery   . 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  - Not a candidate for mechanical thrombectomy w/ distal right vertebral artery occlusion- non amendable to thrombectomy.  - Follow up HgbA1c/ lipid profile - start HD atrovastatin daily   - Bedside dysphagia screen /PT / OT evaluation  - MRI brain w/o contrast  - TTE; r/o cardioembolic event /PFO  - Maintain permissive HTN for 24 hours   INFECTION MGMT.  . MICROBIO.   . ANTIBIO.  . SCV2.   GOC.  ....   BEST PRACTICE ISSUES.   . DIET.   .... 7/9/2025 puree   .... 7/8/2025 NPO except meds   . DYSPHAGIA EVAL.   . FREE WATER.   . IV FLUID.  .... 7/9/2025 off iv fl   .... 7/8/2025 D5 NS 50   . DVT PPLX. 7/8/2025 No pharmac pplx pending neuro eval (sybdural bleed)   . HOB ELEVATN.  Yes  . ASSESS NEED FOR HOME O2 AT DISCHARGE: Will need home O2 if ra rest/exertion po belo 88%   . INFECTION PPLX .   . STRESS ULCER PPLX. 7/8/2025 PROTONIX 40   ....   ICU STAY. None   PROCEDURES/DEVICES .  PMH.   .... Dementia     PATIENT DATA   . ALLERGY.nka  . WEIGHT. 7/8/2025 53  . BMI. 7/8/2025 18     DISCUSSIONS.  .... Discussed with primary care and relevant consultants on an ongoing basis       TIME SPENT.  . Over 36 minutes aggregate care time spent on encounter; activities included   direct patient care, counseling and/or coordinating care reviewing notes, lab data/ imaging , discussion with multidisciplinary team/ patient  /family and explaining in detail risks, benefits, alternatives  of the recommendations     ROSEMARIE DANIEL  74 m 7/8/2025   
   REASON FOR VISIT  .. Management of problems listed below      EVENTS/CURRENT ISSUES.        ROS  . Patient unable to give ROS     PHYSICAL EXAM    HEENT Unremarkable  atraumatic   RESP Fair air entry  Harsh breath sound   CARDIAC S1 S2 No S3     NO JVD    ABDOMEN No hepatosplenomegaly   PEDAL EDEMA present No calf tenderness  CC.   . 7/8/2025 PT BIB EMS from [facility] c/o AMS; pt found unresponsive at facility at 1700 with glucose of 49; pt got glucagon x2 from facility; hx dementia; pt responsive upon arrival to ED ; answers to name     OVERALL PRESENTATION.  . 7/8/2025 74 M brought in by EMS from [facility] for altered mental status.  Per EMS report patient's last known well was just prior to 5 PM when patient was next seen he was unresponsive.  EMS relates staff at [facility] found the patient to be hypoglycemic with a blood sugar of 49 they gave 2 doses of glucagon and oral glucose paste and blood glucose improved to 71 upon EMS arrival.  EMS reports they were told patient has dementia his baseline is awake and confused which is how the patient presented with EMS.  Patient poor historian secondary to dementia unsure why in ER denies any complaints.  No further history available.  PMD Herminio Bird  . [facility].        [id number]   SUMMARY BASELINE .     . 74 m baseline confused sent from [facility]   CC.   . 7/8/2025 HYPOGLYCEMIA 49   . 7/8/2025 UNRESPONSIVE BRIEFLY WHILE AT [facility] AROUND 5P   MAIN ISSUES.  . INFECTION DATA W 7/8/2025 w 3.4 cxr 7/8 napd   . CAD Tr 7/8/2025 Tr 25 7/8 ATORVASTAT 80   . RO CHF tte 7/9 ef 64% normal la and ra RX 7/10/2025 LISINOPRIIL 40  . HEMAT 7/8/2025 Hb 12.3 Plt 238     . RENAL DATA 7/8/2025 Na 141 K 3.6 CO2 28 Cr 1.1   . HYPOGLYCEMIA BGL 7/10 182-259 7/11/2025 bgl 140 - 212 - 213 - 140 A/R 7/8/2025 Monitor   . DM 7/10/2025 LANTUS 10 HS 7/10/2025 SL SCALE   . CHRONIC R SUBDURAL HEMATOMA 7/8/2025 CTA HEAD   . VERTEBRAL R INTRADURAL ARTRY OCCLUSION 7/8 CTA   . ALTERED MENTAL STATE 7/8/2025 ct head 7/8/2025 chronic infarct left occipital lobe Small right holohemispheric subdural upto 0.9 cm in width hygroma vs old subdural hematoma Occlusion of quin distal most segment of right intradural vet=rtebtatl artery   . 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  - Not a candidate for mechanical thrombectomy w/ distal right vertebral artery occlusion- non amendable to thrombectomy.  - Follow up HgbA1c/ lipid profile - start HD atrovastatin daily   - Bedside dysphagia screen /PT / OT evaluation  - MRI brain w/o contrast  - TTE; r/o cardioembolic event /PFO  - Maintain permissive HTN for 24 hours   GOC.  .... 7/11/2025 dnr dni trial nppv  BEST PRACTICE ISSUES.   . DIET.   .... 7/9/2025 puree   .... 7/8/2025 NPO except meds   . DYSPHAGIA EVAL.   . FREE WATER.   . IV FLUID.  .... 7/9/2025 off iv fl   .... 7/8/2025 D5 NS 50   . DVT PPLX. 7/8/2025 No pharmac pplx pending neuro eval (sybdural bleed)   . HOB ELEVATN.  Yes  . ASSESS NEED FOR HOME O2 AT DISCHARGE: Will need home O2 if ra rest/exertion po belo 88%   . INFECTION PPLX .   . STRESS ULCER PPLX. 7/8/2025 PROTONIX 40   ....   ICU STAY. None   PROCEDURES/DEVICES .  PMH.   .... Dementia     PATIENT DATA   . ALLERGY.nka  . WEIGHT. 7/8/2025 53  . BMI. 7/8/2025 18     DISCUSSIONS.  .... Discussed with primary care and relevant consultants on an ongoing basis       TIME SPENT.  . Over 36 minutes aggregate care time spent on encounter; activities included   direct patient care, counseling and/or coordinating care reviewing notes, lab data/ imaging , discussion with multidisciplinary team/ patient  /family and explaining in detail risks, benefits, alternatives  of the recommendations     ROSEMARIE DANIEL  74 m 7/8/2025  
   REASON FOR VISIT  .. Management of problems listed below      EVENTS/CURRENT ISSUES.        ROS  . Patient unable to give ROS     PHYSICAL EXAM    HEENT Unremarkable  atraumatic   RESP Fair air entry  Harsh breath sound   CARDIAC S1 S2 No S3     NO JVD    ABDOMEN No hepatosplenomegaly   PEDAL EDEMA present No calf tenderness  CC.   . 7/8/2025 PT BIB EMS from [facility] c/o AMS; pt found unresponsive at facility at 1700 with glucose of 49; pt got glucagon x2 from facility; hx dementia; pt responsive upon arrival to ED ; answers to name     OVERALL PRESENTATION.  . 7/8/2025 74 M brought in by EMS from [facility] for altered mental status.  Per EMS report patient's last known well was just prior to 5 PM when patient was next seen he was unresponsive.  EMS relates staff at [facility] found the patient to be hypoglycemic with a blood sugar of 49 they gave 2 doses of glucagon and oral glucose paste and blood glucose improved to 71 upon EMS arrival.  EMS reports they were told patient has dementia his baseline is awake and confused which is how the patient presented with EMS.  Patient poor historian secondary to dementia unsure why in ER denies any complaints.  No further history available.  PMD Herminio Bird  . [facility].        VITALS/GAS EXCHANGE/DRIPS  ABGS.     .  VS/ PO/IO/ VENT/ DRIPS.   7/11/2025 afeb 93 110/60     [id number]   SUMMARY BASELINE .     . 74 m baseline confused sent from [facility]   CC.   . 7/8/2025 HYPOGLYCEMIA 49   . 7/8/2025 UNRESPONSIVE BRIEFLY WHILE AT [facility] AROUND 5P   MAIN ISSUES.  . INFECTION DATA W 7/8/2025 w 3.4 cxr 7/8 napd   . CAD Tr 7/8/2025 Tr 25 7/8 ATORVASTAT 80   . RO CHF tte 7/9 ef 64% normal la and ra RX 7/10/2025 LISINOPRIIL 40  . HEMAT 7/8/2025 Hb 12.3 Plt 238     . RENAL DATA 7/8/2025 Na 141 K 3.6 CO2 28 Cr 1.1   . HYPOGLYCEMIA BGL 7/10 182-259 7/11/2025 bgl 140 - 212 - 213 - 140 A/R 7/8/2025 Monitor   . DM 7/10/2025 LANTUS 10 HS 7/10/2025 SL SCALE   . CHRONIC R SUBDURAL HEMATOMA 7/8/2025 CTA HEAD   . VERTEBRAL R INTRADURAL ARTRY OCCLUSION 7/8 CTA   . ALTERED MENTAL STATE 7/8/2025 ct head 7/8/2025 chronic infarct left occipital lobe Small right holohemispheric subdural upto 0.9 cm in width hygroma vs old subdural hematoma Occlusion of quin distal most segment of right intradural vet=rtebtatl artery   . 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  - Not a candidate for mechanical thrombectomy w/ distal right vertebral artery occlusion- non amendable to thrombectomy.  - Follow up HgbA1c/ lipid profile - start HD atrovastatin daily   - Bedside dysphagia screen /PT / OT evaluation  - MRI brain w/o contrast  - TTE; r/o cardioembolic event /PFO  - Maintain permissive HTN for 24 hours   GOC.  .... 7/11/2025 dnr dni trial nppv  BEST PRACTICE ISSUES.   . DIET.   .... 7/9/2025 puree   .... 7/8/2025 NPO except meds   . DYSPHAGIA EVAL.   . FREE WATER.   . IV FLUID.  .... 7/9/2025 off iv fl   .... 7/8/2025 D5 NS 50   . DVT PPLX. 7/8/2025 No pharmac pplx pending neuro eval (sybdural bleed)   . HOB ELEVATN.  Yes  . ASSESS NEED FOR HOME O2 AT DISCHARGE: Will need home O2 if ra rest/exertion po belo 88%   . INFECTION PPLX .   . STRESS ULCER PPLX. 7/8/2025 PROTONIX 40   ....   ICU STAY. None   PROCEDURES/DEVICES .  PMH.   .... Dementia     PATIENT DATA   . ALLERGY.nka  . WEIGHT. 7/8/2025 53  . BMI. 7/8/2025 18     DISCUSSIONS.  .... Discussed with primary care and relevant consultants on an ongoing basis       TIME SPENT.  . Over 36 minutes aggregate care time spent on encounter; activities included   direct patient care, counseling and/or coordinating care reviewing notes, lab data/ imaging , discussion with multidisciplinary team/ patient  /family and explaining in detail risks, benefits, alternatives  of the recommendations     ROSEMARIE patricio 7/8/2025  
Patient is 75 yo AAM who was brought in by EMS from [facility] for altered mental status.  Per EMS report patient's last known well was just prior to 5 PM when patient was next seen he was unresponsive.  EMS relates staff at [facility] found the patient to be hypoglycemic with a blood sugar of 49 they gave 2 doses of glucagon and oral glucose paste and blood glucose improved to 71 upon EMS arrival.  EMS reports they were told patient has dementia his baseline is awake and confused which is how the patient presented with EMS.  Patient poor historian secondary to dementia unsure why in ER denies any complaints.  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  - Not a candidate for mechanical thrombectomy w/ distal right vertebral artery occlusion- non amendable to thrombectomy.  - Follow up HgbA1c/ lipid profile - start HD atrovastatin daily   - Bedside dysphagia screen /PT / OT evaluation  - MRI brain w/o contrast
The patient is a 74 year old male with a history of HTN, HL, DM, BPH, dementia who presents with AMS.    Plan:  - ECG with sinus rhythm and no evidence of ischemia/infarction  - Echo with normal LV systolic function, no significant valve issues  - Monitor on telemetry  - Cardiac enzymes negative  - CTA head and neck with hygroma vs. 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
Physical Exam:   Vital Signs Last 24 Hrs  T(C): 36.5 (11 Jul 2025 08:13), Max: 37.4 (10 Jul 2025 16:29)  T(F): 97.7 (11 Jul 2025 08:13), Max: 99.4 (10 Jul 2025 16:29)  HR: 77 (11 Jul 2025 11:46) (76 - 103)  BP: 158/100 (11 Jul 2025 11:46) (104/91 - 177/91)  BP(mean): 94 (10 Jul 2025 18:09) (94 - 97)  RR: 18 (11 Jul 2025 11:46) (15 - 20)  SpO2: 95% (11 Jul 2025 11:46) (95% - 100%)    Parameters below as of 11 Jul 2025 05:13  Patient On (Oxygen Delivery Method): room air       CAPILLARY BLOOD GLUCOSE      POCT Blood Glucose.: 212 mg/dL (11 Jul 2025 12:44)  POCT Blood Glucose.: 213 mg/dL (11 Jul 2025 08:21)  POCT Blood Glucose.: 140 mg/dL (10 Jul 2025 21:26)  POCT Blood Glucose.: 210 mg/dL (10 Jul 2025 17:02)        DIET: CC  >50%        
Patient is 73 yo AAM who was brought in by EMS from [facility] for altered mental status.  Per EMS report patient's last known well was just prior to 5 PM when patient was next seen he was unresponsive.  EMS relates staff at [facility] found the patient to be hypoglycemic with a blood sugar of 49 they gave 2 doses of glucagon and oral glucose paste and blood glucose improved to 71 upon EMS arrival.  EMS reports they were told patient has dementia his baseline is awake and confused which is how the patient presented with EMS.  Patient poor historian secondary to dementia unsure why in ER denies any complaints.  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  - Not a candidate for mechanical thrombectomy w/ distal right vertebral artery occlusion- non amendable to thrombectomy.  - Follow up HgbA1c/ lipid profile - start HD atrovastatin daily   - Bedside dysphagia screen /PT / OT evaluation  - MRI brain w/o contrast
Patient is 73 yo AAM who was brought in by EMS from [facility] for altered mental status.  Per EMS report patient's last known well was just prior to 5 PM when patient was next seen he was unresponsive.  EMS relates staff at [facility] found the patient to be hypoglycemic with a blood sugar of 49 they gave 2 doses of glucagon and oral glucose paste and blood glucose improved to 71 upon EMS arrival.  EMS reports they were told patient has dementia his baseline is awake and confused which is how the patient presented with EMS.  Patient poor historian secondary to dementia unsure why in ER denies any complaints.  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  - Not a candidate for mechanical thrombectomy w/ distal right vertebral artery occlusion- non amendable to thrombectomy.  - Follow up HgbA1c/ lipid profile - start HD atrovastatin daily   - Bedside dysphagia screen /PT / OT evaluation  - MRI brain w/o contrast

## 2025-07-12 NOTE — PROGRESS NOTE ADULT - SUBJECTIVE AND OBJECTIVE BOX
Neurology follow up note    FREDY [id number]      Interval History:    Patient feels ok no new complaints.    Allergies    Allergy Status Unknown    Intolerances        MEDICATIONS    acetaminophen     Tablet .. 650 milliGRAM(s) Oral every 6 hours PRN  acetaminophen   IVPB .. 1000 milliGRAM(s) IV Intermittent once PRN  aluminum hydroxide/magnesium hydroxide/simethicone Suspension 30 milliLiter(s) Oral every 4 hours PRN  amLODIPine   Tablet 5 milliGRAM(s) Oral daily  atorvastatin 80 milliGRAM(s) Oral at bedtime  bisacodyl Suppository 10 milliGRAM(s) Rectal daily PRN  dextrose 5%. 1000 milliLiter(s) IV Continuous <Continuous>  dextrose 5%. 1000 milliLiter(s) IV Continuous <Continuous>  dextrose 50% Injectable 25 Gram(s) IV Push once  dextrose 50% Injectable 12.5 Gram(s) IV Push once  dextrose 50% Injectable 25 Gram(s) IV Push once  dextrose Oral Gel 15 Gram(s) Oral once  finasteride 5 milliGRAM(s) Oral daily  glucagon  Injectable 1 milliGRAM(s) IntraMuscular once  haloperidol    Injectable 0.5 milliGRAM(s) IntraMuscular every 6 hours PRN  insulin glargine Injectable (LANTUS) 12 Unit(s) SubCutaneous at bedtime  insulin lispro (ADMELOG) corrective regimen sliding scale   SubCutaneous at bedtime  insulin lispro (ADMELOG) corrective regimen sliding scale   SubCutaneous three times a day before meals  latanoprost 0.005% Ophthalmic Solution 1 Drop(s) Both EYES at bedtime  lidocaine   4% Patch 1 Patch Transdermal daily  lisinopril 40 milliGRAM(s) Oral daily  melatonin 3 milliGRAM(s) Oral at bedtime PRN  multivitamin 1 Tablet(s) Oral daily  ondansetron Injectable 4 milliGRAM(s) IV Push every 8 hours PRN  pantoprazole  Injectable 40 milliGRAM(s) IV Push daily  polyethylene glycol 3350 17 Gram(s) Oral daily  risperiDONE   Tablet 0.5 milliGRAM(s) Oral daily  thiamine 100 milliGRAM(s) Oral daily          Height (cm): 170.2 (07-12 @ 06:00)  Weight (kg): 48.3 (07-12 @ 06:00)  BMI (kg/m2): 16.7 (07-12 @ 06:00)    Vital Signs Last 24 Hrs  T(C): 36.9 (12 Jul 2025 06:00), Max: 37.3 (11 Jul 2025 20:35)  T(F): 98.5 (12 Jul 2025 06:00), Max: 99.1 (11 Jul 2025 20:35)  HR: 72 (12 Jul 2025 06:00) (72 - 93)  BP: 149/85 (12 Jul 2025 06:00) (102/78 - 158/100)  BP(mean): 87 (11 Jul 2025 20:35) (82 - 87)  RR: 18 (12 Jul 2025 06:00) (17 - 20)  SpO2: 99% (12 Jul 2025 06:00) (95% - 100%)    Parameters below as of 12 Jul 2025 06:00  Patient On (Oxygen Delivery Method): room air        REVIEW OF SYSTEMS:  Very limited.  The patient has underlying dementia.  He denies any complaint presented to him.    PHYSICAL EXAMINATION:  HEAD:  Normocephalic .  EYES:  Left was opacified.  EARS:  Hearing appeared to be intact.  NECK:  Supple.  CARDIOVASCULAR:  S1, S2 heard.  RESPIRATORY:  Air entry bilaterally.  ABDOMEN:  Soft, nontender.  EXTREMITIES:  No clubbing or cyanosis were noted.    NEUROLOGIC:  The patient is awake, alert, on-off blink to his left eye.  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  Speech was fluent.  Does have a history of cerebrovascular accident with subtle speech abnormalities per daughter.  Motor, bilateral upper and lower 4+ out of 5.  Sensory, bilateral intact to light touch.  Patient could not comprehend complex commands.  Does not no drift to left and right, which is his baseline .                  LABS:            Hemoglobin A1C:       Vitamin B12         RADIOLOGY    ANALYSIS AND PLAN:  This is a 74-year-old with episode of unresponsiveness and altered mental status.    For episode of unresponsive, most likely secondary hypoglycemia.  Once the patient received sugars, return back to his normal self.  Suspect less likely this is a new primary CNS event and the patient had no prior history to suggest underlying epilepsy.  For history of diabetes, to control blood sugars.  For history of dementia, appears to be advanced, supportive therapy.  Hypertension.  Monitor systolic blood pressure.  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  He does have a history of falls in the past.  These are likely chronic.  Supportive therapy. Form a neurologic standpoint no need for asa unless needed for medical reasons   Spoke with daughter, Aide, at [phone] 7/10.  She understands the above process.  Neurologic wise stable dc planning     48 minutes of time spent with patient, plan of care, reviewing data, speaking to multidisciplinary healthcare team with greater than 50% time counseling care and coordination.  Thank you for courtesy of consultation.         Neurology follow up note    FREDY [id number]      Interval History:    Patient resting in bed     Allergies    Allergy Status Unknown    Intolerances        MEDICATIONS    acetaminophen     Tablet .. 650 milliGRAM(s) Oral every 6 hours PRN  acetaminophen   IVPB .. 1000 milliGRAM(s) IV Intermittent once PRN  aluminum hydroxide/magnesium hydroxide/simethicone Suspension 30 milliLiter(s) Oral every 4 hours PRN  amLODIPine   Tablet 5 milliGRAM(s) Oral daily  atorvastatin 80 milliGRAM(s) Oral at bedtime  bisacodyl Suppository 10 milliGRAM(s) Rectal daily PRN  dextrose 5%. 1000 milliLiter(s) IV Continuous <Continuous>  dextrose 5%. 1000 milliLiter(s) IV Continuous <Continuous>  dextrose 50% Injectable 25 Gram(s) IV Push once  dextrose 50% Injectable 12.5 Gram(s) IV Push once  dextrose 50% Injectable 25 Gram(s) IV Push once  dextrose Oral Gel 15 Gram(s) Oral once  finasteride 5 milliGRAM(s) Oral daily  glucagon  Injectable 1 milliGRAM(s) IntraMuscular once  haloperidol    Injectable 0.5 milliGRAM(s) IntraMuscular every 6 hours PRN  insulin glargine Injectable (LANTUS) 12 Unit(s) SubCutaneous at bedtime  insulin lispro (ADMELOG) corrective regimen sliding scale   SubCutaneous at bedtime  insulin lispro (ADMELOG) corrective regimen sliding scale   SubCutaneous three times a day before meals  latanoprost 0.005% Ophthalmic Solution 1 Drop(s) Both EYES at bedtime  lidocaine   4% Patch 1 Patch Transdermal daily  lisinopril 40 milliGRAM(s) Oral daily  melatonin 3 milliGRAM(s) Oral at bedtime PRN  multivitamin 1 Tablet(s) Oral daily  ondansetron Injectable 4 milliGRAM(s) IV Push every 8 hours PRN  pantoprazole  Injectable 40 milliGRAM(s) IV Push daily  polyethylene glycol 3350 17 Gram(s) Oral daily  risperiDONE   Tablet 0.5 milliGRAM(s) Oral daily  thiamine 100 milliGRAM(s) Oral daily          Height (cm): 170.2 (07-12 @ 06:00)  Weight (kg): 48.3 (07-12 @ 06:00)  BMI (kg/m2): 16.7 (07-12 @ 06:00)    Vital Signs Last 24 Hrs  T(C): 36.9 (12 Jul 2025 06:00), Max: 37.3 (11 Jul 2025 20:35)  T(F): 98.5 (12 Jul 2025 06:00), Max: 99.1 (11 Jul 2025 20:35)  HR: 72 (12 Jul 2025 06:00) (72 - 93)  BP: 149/85 (12 Jul 2025 06:00) (102/78 - 158/100)  BP(mean): 87 (11 Jul 2025 20:35) (82 - 87)  RR: 18 (12 Jul 2025 06:00) (17 - 20)  SpO2: 99% (12 Jul 2025 06:00) (95% - 100%)    Parameters below as of 12 Jul 2025 06:00  Patient On (Oxygen Delivery Method): room air        REVIEW OF SYSTEMS:  Very limited.  The patient has underlying dementia.  He denies any complaint presented to him.    PHYSICAL EXAMINATION:  HEAD:  Normocephalic .  EYES:  Left was opacified.  EARS:  Hearing appeared to be intact.  NECK:  Supple.  CARDIOVASCULAR:  S1, S2 heard.  RESPIRATORY:  Air entry bilaterally.  ABDOMEN:  Soft, nontender.  EXTREMITIES:  No clubbing or cyanosis were noted.    NEUROLOGIC:  The patient is awake, alert, on-off blink to his left eye.  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  Speech was fluent.  Does have a history of cerebrovascular accident with subtle speech abnormalities per daughter.  Motor, bilateral upper and lower 4+ out of 5.  Sensory, bilateral intact to light touch.  Patient could not comprehend complex commands.  Does not no drift to left and right, which is his baseline .        LABS:            Hemoglobin A1C:       Vitamin B12         RADIOLOGY    ANALYSIS AND PLAN:  This is a 74-year-old with episode of unresponsiveness and altered mental status.    For episode of unresponsive, most likely secondary hypoglycemia.  Once the patient received sugars, return back to his normal self.  Suspect less likely this is a new primary CNS event and the patient had no prior history to suggest underlying epilepsy.  For history of diabetes, to control blood sugars.  For history of dementia, appears to be advanced, supportive therapy.  Hypertension.  Monitor systolic blood pressure.  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  He does have a history of falls in the past.  These are likely chronic.  Supportive therapy. Form a neurologic standpoint no need for asa unless needed for medical reasons   Spoke with daughter, Aide, at [phone] 7/10.  She understands the above process.  spoke to staff no major overnight events   Neurologic wise stable dc planning     44 minutes of time spent with patient, plan of care, reviewing data, speaking to multidisciplinary healthcare team with greater than 50% time counseling care and coordination.  Thank you for courtesy of consultation.